# Patient Record
Sex: FEMALE | Race: WHITE | NOT HISPANIC OR LATINO | Employment: FULL TIME | URBAN - METROPOLITAN AREA
[De-identification: names, ages, dates, MRNs, and addresses within clinical notes are randomized per-mention and may not be internally consistent; named-entity substitution may affect disease eponyms.]

---

## 2023-04-16 ENCOUNTER — APPOINTMENT (OUTPATIENT)
Dept: ULTRASOUND IMAGING | Facility: CLINIC | Age: 43
End: 2023-04-16
Attending: EMERGENCY MEDICINE
Payer: COMMERCIAL

## 2023-04-16 ENCOUNTER — HOSPITAL ENCOUNTER (EMERGENCY)
Facility: CLINIC | Age: 43
Discharge: HOME | End: 2023-04-16
Attending: EMERGENCY MEDICINE | Admitting: EMERGENCY MEDICINE
Payer: COMMERCIAL

## 2023-04-16 ENCOUNTER — APPOINTMENT (OUTPATIENT)
Dept: RADIOLOGY | Facility: CLINIC | Age: 43
End: 2023-04-16
Attending: EMERGENCY MEDICINE
Payer: COMMERCIAL

## 2023-04-16 ENCOUNTER — APPOINTMENT (OUTPATIENT)
Dept: CT IMAGING | Facility: CLINIC | Age: 43
End: 2023-04-16
Attending: EMERGENCY MEDICINE
Payer: COMMERCIAL

## 2023-04-16 VITALS
OXYGEN SATURATION: 99 % | RESPIRATION RATE: 24 BRPM | HEART RATE: 78 BPM | BODY MASS INDEX: 30.9 KG/M2 | SYSTOLIC BLOOD PRESSURE: 137 MMHG | WEIGHT: 180 LBS | DIASTOLIC BLOOD PRESSURE: 77 MMHG | TEMPERATURE: 98.1 F

## 2023-04-16 DIAGNOSIS — D50.9 MICROCYTIC ANEMIA: ICD-10-CM

## 2023-04-16 DIAGNOSIS — M25.572 ACUTE LEFT ANKLE PAIN: ICD-10-CM

## 2023-04-16 DIAGNOSIS — I82.4Z2 ACUTE DEEP VEIN THROMBOSIS (DVT) OF DISTAL VEIN OF LEFT LOWER EXTREMITY (H): ICD-10-CM

## 2023-04-16 DIAGNOSIS — F41.9 ANXIETY: ICD-10-CM

## 2023-04-16 DIAGNOSIS — R11.0 NAUSEA: ICD-10-CM

## 2023-04-16 DIAGNOSIS — R53.83 OTHER FATIGUE: ICD-10-CM

## 2023-04-16 LAB
ALBUMIN SERPL-MCNC: 3.7 G/DL (ref 3.5–5)
ALP SERPL-CCNC: 66 U/L (ref 45–120)
ALT SERPL W P-5'-P-CCNC: 18 U/L (ref 0–45)
ANION GAP SERPL CALCULATED.3IONS-SCNC: 8 MMOL/L (ref 5–18)
AST SERPL W P-5'-P-CCNC: 16 U/L (ref 0–40)
ATRIAL RATE - MUSE: 79 BPM
ATRIAL RATE - MUSE: 81 BPM
BASOPHILS # BLD AUTO: 0 10E3/UL (ref 0–0.2)
BASOPHILS NFR BLD AUTO: 0 %
BILIRUB SERPL-MCNC: 0.2 MG/DL (ref 0–1)
BUN SERPL-MCNC: 17 MG/DL (ref 8–22)
CALCIUM SERPL-MCNC: 9.1 MG/DL (ref 8.5–10.5)
CHLORIDE BLD-SCNC: 110 MMOL/L (ref 98–107)
CO2 SERPL-SCNC: 21 MMOL/L (ref 22–31)
CREAT SERPL-MCNC: 0.73 MG/DL (ref 0.6–1.1)
D DIMER PPP FEU-MCNC: 0.82 UG/ML FEU (ref 0–0.5)
DIASTOLIC BLOOD PRESSURE - MUSE: 90 MMHG
DIASTOLIC BLOOD PRESSURE - MUSE: 94 MMHG
EOSINOPHIL # BLD AUTO: 0.1 10E3/UL (ref 0–0.7)
EOSINOPHIL NFR BLD AUTO: 1 %
ERYTHROCYTE [DISTWIDTH] IN BLOOD BY AUTOMATED COUNT: 15.9 % (ref 10–15)
GFR SERPL CREATININE-BSD FRML MDRD: >90 ML/MIN/1.73M2
GLUCOSE BLD-MCNC: 131 MG/DL (ref 70–125)
HCG SERPL QL: NEGATIVE
HCT VFR BLD AUTO: 31.3 % (ref 35–47)
HGB BLD-MCNC: 9.4 G/DL (ref 11.7–15.7)
IMM GRANULOCYTES # BLD: 0 10E3/UL
IMM GRANULOCYTES NFR BLD: 0 %
INTERPRETATION ECG - MUSE: NORMAL
INTERPRETATION ECG - MUSE: NORMAL
LIPASE SERPL-CCNC: 50 U/L (ref 0–52)
LYMPHOCYTES # BLD AUTO: 1.7 10E3/UL (ref 0.8–5.3)
LYMPHOCYTES NFR BLD AUTO: 22 %
MAGNESIUM SERPL-MCNC: 1.9 MG/DL (ref 1.8–2.6)
MCH RBC QN AUTO: 22.7 PG (ref 26.5–33)
MCHC RBC AUTO-ENTMCNC: 30 G/DL (ref 31.5–36.5)
MCV RBC AUTO: 75 FL (ref 78–100)
MONOCYTES # BLD AUTO: 0.6 10E3/UL (ref 0–1.3)
MONOCYTES NFR BLD AUTO: 8 %
NEUTROPHILS # BLD AUTO: 5.5 10E3/UL (ref 1.6–8.3)
NEUTROPHILS NFR BLD AUTO: 69 %
NRBC # BLD AUTO: 0 10E3/UL
NRBC BLD AUTO-RTO: 0 /100
P AXIS - MUSE: 26 DEGREES
P AXIS - MUSE: 36 DEGREES
PLATELET # BLD AUTO: 244 10E3/UL (ref 150–450)
POTASSIUM BLD-SCNC: 4.1 MMOL/L (ref 3.5–5)
PR INTERVAL - MUSE: 166 MS
PR INTERVAL - MUSE: 170 MS
PROT SERPL-MCNC: 7.3 G/DL (ref 6–8)
QRS DURATION - MUSE: 72 MS
QRS DURATION - MUSE: 72 MS
QT - MUSE: 354 MS
QT - MUSE: 372 MS
QTC - MUSE: 411 MS
QTC - MUSE: 426 MS
R AXIS - MUSE: -11 DEGREES
R AXIS - MUSE: -6 DEGREES
RBC # BLD AUTO: 4.15 10E6/UL (ref 3.8–5.2)
SODIUM SERPL-SCNC: 139 MMOL/L (ref 136–145)
SYSTOLIC BLOOD PRESSURE - MUSE: 150 MMHG
SYSTOLIC BLOOD PRESSURE - MUSE: 160 MMHG
T AXIS - MUSE: 35 DEGREES
T AXIS - MUSE: 42 DEGREES
TROPONIN I SERPL-MCNC: 0.01 NG/ML (ref 0–0.29)
TROPONIN I SERPL-MCNC: 0.02 NG/ML (ref 0–0.29)
VENTRICULAR RATE- MUSE: 79 BPM
VENTRICULAR RATE- MUSE: 81 BPM
WBC # BLD AUTO: 7.9 10E3/UL (ref 4–11)

## 2023-04-16 PROCEDURE — 84484 ASSAY OF TROPONIN QUANT: CPT | Mod: 91 | Performed by: EMERGENCY MEDICINE

## 2023-04-16 PROCEDURE — 36415 COLL VENOUS BLD VENIPUNCTURE: CPT | Performed by: EMERGENCY MEDICINE

## 2023-04-16 PROCEDURE — 73610 X-RAY EXAM OF ANKLE: CPT | Mod: LT

## 2023-04-16 PROCEDURE — 99285 EMERGENCY DEPT VISIT HI MDM: CPT | Mod: 25

## 2023-04-16 PROCEDURE — 250N000013 HC RX MED GY IP 250 OP 250 PS 637: Performed by: EMERGENCY MEDICINE

## 2023-04-16 PROCEDURE — 83735 ASSAY OF MAGNESIUM: CPT | Performed by: EMERGENCY MEDICINE

## 2023-04-16 PROCEDURE — 85014 HEMATOCRIT: CPT | Performed by: EMERGENCY MEDICINE

## 2023-04-16 PROCEDURE — 84484 ASSAY OF TROPONIN QUANT: CPT | Performed by: EMERGENCY MEDICINE

## 2023-04-16 PROCEDURE — 83690 ASSAY OF LIPASE: CPT | Performed by: EMERGENCY MEDICINE

## 2023-04-16 PROCEDURE — 93005 ELECTROCARDIOGRAM TRACING: CPT | Mod: 76 | Performed by: EMERGENCY MEDICINE

## 2023-04-16 PROCEDURE — 71275 CT ANGIOGRAPHY CHEST: CPT

## 2023-04-16 PROCEDURE — 93971 EXTREMITY STUDY: CPT | Mod: LT

## 2023-04-16 PROCEDURE — 250N000011 HC RX IP 250 OP 636: Performed by: EMERGENCY MEDICINE

## 2023-04-16 PROCEDURE — 85379 FIBRIN DEGRADATION QUANT: CPT | Performed by: EMERGENCY MEDICINE

## 2023-04-16 PROCEDURE — 84703 CHORIONIC GONADOTROPIN ASSAY: CPT | Performed by: EMERGENCY MEDICINE

## 2023-04-16 PROCEDURE — 80053 COMPREHEN METABOLIC PANEL: CPT | Performed by: EMERGENCY MEDICINE

## 2023-04-16 RX ORDER — HYDROXYZINE HYDROCHLORIDE 25 MG/1
25 TABLET, FILM COATED ORAL ONCE
Status: COMPLETED | OUTPATIENT
Start: 2023-04-16 | End: 2023-04-16

## 2023-04-16 RX ORDER — IOPAMIDOL 755 MG/ML
90 INJECTION, SOLUTION INTRAVASCULAR ONCE
Status: COMPLETED | OUTPATIENT
Start: 2023-04-16 | End: 2023-04-16

## 2023-04-16 RX ORDER — HYDROXYZINE HYDROCHLORIDE 25 MG/1
25 TABLET, FILM COATED ORAL 3 TIMES DAILY PRN
Qty: 30 TABLET | Refills: 0 | Status: SHIPPED | OUTPATIENT
Start: 2023-04-16

## 2023-04-16 RX ORDER — HYDROXYZINE HYDROCHLORIDE 25 MG/1
25 TABLET, FILM COATED ORAL 3 TIMES DAILY PRN
Qty: 30 TABLET | Refills: 0 | Status: SHIPPED | OUTPATIENT
Start: 2023-04-16 | End: 2023-04-16

## 2023-04-16 RX ADMIN — HYDROXYZINE HYDROCHLORIDE 25 MG: 25 TABLET ORAL at 04:20

## 2023-04-16 RX ADMIN — IOPAMIDOL 90 ML: 755 INJECTION, SOLUTION INTRAVENOUS at 06:48

## 2023-04-16 RX ADMIN — RIVAROXABAN 15 MG: 15 TABLET, FILM COATED ORAL at 07:39

## 2023-04-16 ASSESSMENT — ACTIVITIES OF DAILY LIVING (ADL)
ADLS_ACUITY_SCORE: 35
ADLS_ACUITY_SCORE: 35

## 2023-04-16 NOTE — ED PROVIDER NOTES
EMERGENCY DEPARTMENT ENCOUNTER      NAME: Anjali Pascal  AGE: 42 year old female  YOB: 1980  MRN: [id number]  EVALUATION DATE & TIME: 2023  3:40 AM    PCP: Kailey Bryant    ED PROVIDER: Hernesto Godoy MD        Chief Complaint   Patient presents with     Nausea     Ankle Pain         FINAL IMPRESSION:  1. Anxiety    2. Acute left ankle pain    3. Other fatigue    4. Microcytic anemia          ED COURSE & MEDICAL DECISION MAKING:    Pertinent Labs & Imaging studies reviewed. (See chart for details)  42 year old female presents to the Emergency Department for evaluation of nausea, anxiety, dizziness, tingling fingers, restlessness started roughly 1.5 hours prior to arrival while at work.  Been under a lot of stress recently reports her grandmother  recently working 80 hours a week and only getting paid for 50 hours.  Denies any vomiting, chest pain, or abdominal pain    No dysuria or urinary frequency    Not on any medications.  States she does not drink alcohol regularly    On exam slightly anxious appearing.  Seizure, serotonin syndrome unlikely.  Alcohol withdrawal unlikely.    We will obtain EKG and labs to evaluate for ACS, hyponatremia, decreased magnesium, hypoglycemia, hypocalcemia, pregnancy    Such as troponin, magnesium, BMP, CBC, D-dimer, ECG    Suspect symptoms are secondary to anxiety was given hydroxyzine since no acute     has some left ankle pain after walking around and flying back from [place] at her grandmother's wake.  Tenderness over lateral malleoli.  Septic joint unlikely.  Likely ankle sprain will obtain x-ray to look for fracture.  Will obtain D-dimer and if elevated ultrasound lower extremities since pain does extend into her left calf    Elevated D-dimer and therefore ultrasound ordered    Given hydroxyzine for likely anxiety and symptoms have improved    ED Course as of 23 0516   Sun 2023   0500 Magnesium: 1.9   0501 HCG Qualitative Serum:  Negative   0501 Troponin I: 0.02   0501 Lipase: 50   0501 Bilirubin Total: 0.2   0501 ALT: 18   0501 AST: 16   0501 Alkaline Phosphatase: 66   0501 Calcium: 9.1   0501 Glucose(!): 131  Nonfasting   0501 Sodium: 139   0501 Potassium: 4.1   0503 Troponin I: 0.02  Obtain delta troponin and EKG   0505 Hemoglobin(!): 9.4   0505 MCV(!): 75  Chronic microcytic anemia.  Patient reports chronic fatigue.  Discussed follow-up primary care doctor for iron studies   0505 WBC: 7.9   0514 Ankle x-ray negative     Plan for discharge home with hydroxyzine 3 times daily.  Recommend follow-up with primary care doctor for consideration of SSRIs for anxiety.    Patient signed out to Dr. Raymundo pending completion of ultrasound and 0700 EKG and trop    If negative plan for discharge home with hydroxyzine 3 times daily      Medical Decision Making    History:    Supplemental history from: Documented in chart, if applicable    External Record(s) reviewed: Documented in chart, if applicable.    Work Up:    Chart documentation includes differential considered and any EKGs or imaging independently interpreted by provider, where specified.    In additional to work up documented, I considered the following work up: Documented in chart, if applicable.    External consultation:    Discussion of management with another provider: Documented in chart, if applicable    Complicating factors:    Care impacted by chronic illness: Mental Health    Care affected by social determinants of health: N/A    Disposition considerations: Admission considered. Patient was signed out to the oncoming physician, disposition pending.          Voice recognition software was used in the creation of this note. Any grammatical or nonsensical errors are due to inherent errors with the software and are not the intention of the writer.         At the conclusion of the encounter I discussed the results of all of the tests and the disposition. The questions were answered. The  "patient or family acknowledged understanding and was agreeable with the care plan.         MEDICATIONS GIVEN IN THE EMERGENCY:  Medications   hydrOXYzine (ATARAX) tablet 25 mg (25 mg Oral $Given 23 0420)       NEW PRESCRIPTIONS STARTED AT TODAY'S ER VISIT  New Prescriptions    HYDROXYZINE (ATARAX) 25 MG TABLET    Take 1 tablet (25 mg) by mouth 3 times daily as needed for itching          =================================================================    HPI    Triage note  \"Pt reports nausea, dizziness, finger tingling, and shakiness that began while at work approx 1.5 hours ago. Also reports left ankle pain/swelling since Tuesday. No known trauma/injury. Denies sick contacts. Denies emesis. Took tylenol around 2300 for HA that has resolved.       \"      Patient information was obtained from: patient        Anjali Pascal is a 42 year old female with a pertinent history of depression who presents to this ED  for evaluation of nausea, dizziness, finger tingling and overall malaise that started 1.5 hours prior to arrival.  Works a  at [employer] and is working 80 hours a week.  Today works from 9 AM until 1 something this morning.  Denies chest pain.  Not on any medications.  Had similar symptoms in the past when she used edibles but states she has not used edibles since.  Denies daily alcohol use.  No history alcohol withdrawal.  Overall states she has not been sleeping well has been under a lot of stress.  Grandmother recently  and recently traveled from [place] and has left ankle pain.  States she does feel anxious.    Denies any injury to her left ankle.  Has not seen primary care doctor in a long time.  Reports chronic fatigue        REVIEW OF SYSTEMS   Review of Systems     PAST MEDICAL HISTORY:  Past Medical History:   Diagnosis Date     Depressive disorder 2005     Thrombocytopenia, unspecified (H) 4/3/2007       PAST SURGICAL HISTORY:  No past surgical history on " file.        CURRENT MEDICATIONS:    hydrOXYzine (ATARAX) 25 MG tablet  albuterol (PROVENTIL HFA;VENTOLIN HFA) 90 mcg/actuation inhaler  sucralfate (CARAFATE) 1 gram tablet        ALLERGIES:  No Known Allergies    FAMILY HISTORY:  No family history on file.    SOCIAL HISTORY:   Social History     Socioeconomic History     Marital status: Single       VITALS:  /85   Pulse 67   Temp 98.1  F (36.7  C)   Resp 20   Wt 81.6 kg (180 lb)   SpO2 97%   BMI 30.90 kg/m      PHYSICAL EXAM      Vitals: /85   Pulse 67   Temp 98.1  F (36.7  C)   Resp 20   Wt 81.6 kg (180 lb)   SpO2 97%   BMI 30.90 kg/m    General: Appears in no acute distress, awake, alert, interactive.  Eyes: Conjunctivae non-injected. Sclera anicteric.  HENT: Atraumatic.  Neck: Supple.  Respiratory/Chest: Respiration unlabored.  No wheezing or crackles  Heart: Regular rate and rhythm  Abdomen: non distended.  No abdominal tenderness  Musculoskeletal: Tenderness over lateral ankle and left calf.  Full range of motion of left ankle.  No redness to left ankle.  Skin: Normal color. No rash or diaphoresis.  Neurologic: Face symmetric, moves all extremities spontaneously. Speech clear.  Psychiatric: Oriented to person, place, and time.  Depressed affect, no suicidal  ideation.  General restlessness.  No clonus       LAB:  All pertinent labs reviewed and interpreted.  Results for orders placed or performed during the hospital encounter of 04/16/23   XR Ankle Left G/E 3 Views    Impression    IMPRESSION:   1. No visualized acute fracture, malalignment or other acute osseous abnormality of the left ankle.  2. Mild degenerative changes in the tibiotalar joint.  3. Posterior and plantar calcaneal spurs.      Result Value Ref Range    Troponin I 0.02 0.00 - 0.29 ng/mL   Comprehensive metabolic panel   Result Value Ref Range    Sodium 139 136 - 145 mmol/L    Potassium 4.1 3.5 - 5.0 mmol/L    Chloride 110 (H) 98 - 107 mmol/L    Carbon Dioxide (CO2) 21  (L) 22 - 31 mmol/L    Anion Gap 8 5 - 18 mmol/L    Urea Nitrogen 17 8 - 22 mg/dL    Creatinine 0.73 0.60 - 1.10 mg/dL    Calcium 9.1 8.5 - 10.5 mg/dL    Glucose 131 (H) 70 - 125 mg/dL    Alkaline Phosphatase 66 45 - 120 U/L    AST 16 0 - 40 U/L    ALT 18 0 - 45 U/L    Protein Total 7.3 6.0 - 8.0 g/dL    Albumin 3.7 3.5 - 5.0 g/dL    Bilirubin Total 0.2 0.0 - 1.0 mg/dL    GFR Estimate >90 >60 mL/min/1.73m2   Result Value Ref Range    Lipase 50 0 - 52 U/L   Result Value Ref Range    Magnesium 1.9 1.8 - 2.6 mg/dL   HCG QUALitative pregnancy (blood)   Result Value Ref Range    hCG Serum Qualitative Negative Negative   D dimer quantitative   Result Value Ref Range    D-Dimer Quantitative 0.82 (H) 0.00 - 0.50 ug/mL FEU   CBC with platelets and differential   Result Value Ref Range    WBC Count 7.9 4.0 - 11.0 10e3/uL    RBC Count 4.15 3.80 - 5.20 10e6/uL    Hemoglobin 9.4 (L) 11.7 - 15.7 g/dL    Hematocrit 31.3 (L) 35.0 - 47.0 %    MCV 75 (L) 78 - 100 fL    MCH 22.7 (L) 26.5 - 33.0 pg    MCHC 30.0 (L) 31.5 - 36.5 g/dL    RDW 15.9 (H) 10.0 - 15.0 %    Platelet Count 244 150 - 450 10e3/uL    % Neutrophils 69 %    % Lymphocytes 22 %    % Monocytes 8 %    % Eosinophils 1 %    % Basophils 0 %    % Immature Granulocytes 0 %    NRBCs per 100 WBC 0 <1 /100    Absolute Neutrophils 5.5 1.6 - 8.3 10e3/uL    Absolute Lymphocytes 1.7 0.8 - 5.3 10e3/uL    Absolute Monocytes 0.6 0.0 - 1.3 10e3/uL    Absolute Eosinophils 0.1 0.0 - 0.7 10e3/uL    Absolute Basophils 0.0 0.0 - 0.2 10e3/uL    Absolute Immature Granulocytes 0.0 <=0.4 10e3/uL    Absolute NRBCs 0.0 10e3/uL   ECG 12-LEAD WITH MUSE (LHE)   Result Value Ref Range    Systolic Blood Pressure 160 mmHg    Diastolic Blood Pressure 94 mmHg    Ventricular Rate 81 BPM    Atrial Rate 81 BPM    MO Interval 170 ms    QRS Duration 72 ms     ms    QTc 411 ms    P Axis 36 degrees    R AXIS -6 degrees    T Axis 42 degrees    Interpretation ECG       Sinus rhythm  Anterior infarct (cited on  or before 29-NOV-2020)  Abnormal ECG  When compared with ECG of 09-DEC-2020 20:23,  Premature ventricular complexes are no longer Present  Confirmed by SEE ED PROVIDER NOTE FOR, ECG INTERPRETATION ([id number]),  Zeus Loja ([id number]) on 4/16/2023 4:48:27 AM         RADIOLOGY:  Reviewed all pertinent imaging. Please see official radiology report.  XR Ankle Left G/E 3 Views   Final Result   IMPRESSION:    1. No visualized acute fracture, malalignment or other acute osseous abnormality of the left ankle.   2. Mild degenerative changes in the tibiotalar joint.   3. Posterior and plantar calcaneal spurs.          US Lower Extremity Venous Duplex Left    (Results Pending)       EKG:    Performed at: April 16, 2023, 4:02 AM, [facility] EMERGENCY ROOM    Impression: Sinus rhythm. Anterior infarct. When compared with ECG of 9-DEC-2020, Premature ventricular complexes are no longer present.     Rate: 81 BPM  Rhythm: Sinus rhythm  Axis: 36 -6 42  MT Interval: 170 ms  QRS Interval: 72 ms  QTc Interval: 354/411 ms  ST Changes: No ST changes  Comparison: When compared with ECG of 9-DEC-2020, Premature ventricular complexes are no longer present.     I have independently reviewed and interpreted the EKG(s) documented above.          I, Dewayne Saha, am serving as a scribe to document services personally performed by Orlando Godoy MD based on my observation and the provider's statements to me. I, Dr. Orlando Godoy, attest that Dewayne Saha is acting in a scribe capacity, has observed my performance of the services and has documented them in accordance with my direction.    Orlando Godoy MD  Emergency Medicine  [facility] EMERGENCY ROOM  [address] [address]  [phone]       Orlando Godoy MD  04/16/23 0529

## 2023-04-16 NOTE — DISCHARGE INSTRUCTIONS
There are prescriptions for hydroxyzine  (anxiety medication) and Xarelto  (blood thinning medication) have been sent to your [facility] pharmacy in [place].      Dr. Godoy (initial ER provider) recommend hydroxyzine 3 times daily as needed.  Follow-up with your doctor for iron studies and for following of this DVT, blood clot in your leg.  Return to the ER for worsening symptoms, such as chest pain, difficulty breathing, feel like you could pass out, worsening pain of the leg or swelling, cold or numb toes, or any other concerns.    Thank you for choosing [facility].  It has been my pleasure caring for you today.     ~Dr. Trey MD

## 2023-04-16 NOTE — ED TRIAGE NOTES
Pt reports nausea, dizziness, finger tingling, and shakiness that began while at work approx 1.5 hours ago. Also reports left ankle pain/swelling since Tuesday. No known trauma/injury. Denies sick contacts. Denies emesis. Took tylenol around 2300 for HA that has resolved.
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
## 2023-11-13 ENCOUNTER — HOSPITAL ENCOUNTER (EMERGENCY)
Facility: CLINIC | Age: 43
Discharge: HOME | End: 2023-11-13
Admitting: PHYSICIAN ASSISTANT
Payer: COMMERCIAL

## 2023-11-13 ENCOUNTER — APPOINTMENT (OUTPATIENT)
Dept: CT IMAGING | Facility: CLINIC | Age: 43
End: 2023-11-13
Attending: PHYSICIAN ASSISTANT
Payer: COMMERCIAL

## 2023-11-13 ENCOUNTER — APPOINTMENT (OUTPATIENT)
Dept: ULTRASOUND IMAGING | Facility: CLINIC | Age: 43
End: 2023-11-13
Attending: STUDENT IN AN ORGANIZED HEALTH CARE EDUCATION/TRAINING PROGRAM
Payer: COMMERCIAL

## 2023-11-13 VITALS
RESPIRATION RATE: 18 BRPM | TEMPERATURE: 98.4 F | SYSTOLIC BLOOD PRESSURE: 128 MMHG | DIASTOLIC BLOOD PRESSURE: 85 MMHG | BODY MASS INDEX: 34.16 KG/M2 | OXYGEN SATURATION: 98 % | HEART RATE: 68 BPM | WEIGHT: 199 LBS

## 2023-11-13 DIAGNOSIS — R42 LIGHTHEADEDNESS: ICD-10-CM

## 2023-11-13 DIAGNOSIS — Z79.01 CHRONIC ANTICOAGULATION: ICD-10-CM

## 2023-11-13 DIAGNOSIS — I80.02 THROMBOPHLEBITIS OF SUPERFICIAL VEINS OF LEFT LOWER EXTREMITY: ICD-10-CM

## 2023-11-13 LAB
ALBUMIN UR-MCNC: 20 MG/DL
ANION GAP SERPL CALCULATED.3IONS-SCNC: 11 MMOL/L (ref 7–15)
APPEARANCE UR: CLEAR
ATRIAL RATE - MUSE: 82 BPM
BILIRUB UR QL STRIP: NEGATIVE
BUN SERPL-MCNC: 11.6 MG/DL (ref 6–20)
CALCIUM SERPL-MCNC: 10.3 MG/DL (ref 8.6–10)
CHLORIDE SERPL-SCNC: 104 MMOL/L (ref 98–107)
COLOR UR AUTO: YELLOW
CREAT SERPL-MCNC: 0.66 MG/DL (ref 0.51–0.95)
DEPRECATED HCO3 PLAS-SCNC: 23 MMOL/L (ref 22–29)
DIASTOLIC BLOOD PRESSURE - MUSE: NORMAL MMHG
EGFRCR SERPLBLD CKD-EPI 2021: >90 ML/MIN/1.73M2
ERYTHROCYTE [DISTWIDTH] IN BLOOD BY AUTOMATED COUNT: 13 % (ref 10–15)
GLUCOSE SERPL-MCNC: 105 MG/DL (ref 70–99)
GLUCOSE UR STRIP-MCNC: NEGATIVE MG/DL
HCG SERPL QL: NEGATIVE
HCT VFR BLD AUTO: 42.7 % (ref 35–47)
HGB BLD-MCNC: 13.6 G/DL (ref 11.7–15.7)
HGB UR QL STRIP: ABNORMAL
HOLD SPECIMEN: NORMAL
INTERPRETATION ECG - MUSE: NORMAL
KETONES UR STRIP-MCNC: NEGATIVE MG/DL
LEUKOCYTE ESTERASE UR QL STRIP: NEGATIVE
MAGNESIUM SERPL-MCNC: 2.2 MG/DL (ref 1.7–2.3)
MCH RBC QN AUTO: 28.7 PG (ref 26.5–33)
MCHC RBC AUTO-ENTMCNC: 31.9 G/DL (ref 31.5–36.5)
MCV RBC AUTO: 90 FL (ref 78–100)
MUCOUS THREADS #/AREA URNS LPF: PRESENT /LPF
NITRATE UR QL: NEGATIVE
P AXIS - MUSE: 50 DEGREES
PH UR STRIP: 5.5 [PH] (ref 5–7)
PLATELET # BLD AUTO: 187 10E3/UL (ref 150–450)
POTASSIUM SERPL-SCNC: 4.1 MMOL/L (ref 3.4–5.3)
PR INTERVAL - MUSE: 164 MS
QRS DURATION - MUSE: 76 MS
QT - MUSE: 362 MS
QTC - MUSE: 422 MS
R AXIS - MUSE: 23 DEGREES
RBC # BLD AUTO: 4.74 10E6/UL (ref 3.8–5.2)
RBC URINE: 2 /HPF
SODIUM SERPL-SCNC: 138 MMOL/L (ref 135–145)
SP GR UR STRIP: >1.03 (ref 1–1.03)
SQUAMOUS EPITHELIAL: 1 /HPF
SYSTOLIC BLOOD PRESSURE - MUSE: NORMAL MMHG
T AXIS - MUSE: 39 DEGREES
TROPONIN T SERPL HS-MCNC: <6 NG/L
TSH SERPL DL<=0.005 MIU/L-ACNC: 0.46 UIU/ML (ref 0.3–4.2)
UROBILINOGEN UR STRIP-MCNC: 2 MG/DL
VENTRICULAR RATE- MUSE: 82 BPM
WBC # BLD AUTO: 10.8 10E3/UL (ref 4–11)
WBC URINE: 0 /HPF

## 2023-11-13 PROCEDURE — 84484 ASSAY OF TROPONIN QUANT: CPT | Performed by: PHYSICIAN ASSISTANT

## 2023-11-13 PROCEDURE — 250N000011 HC RX IP 250 OP 636: Performed by: RADIOLOGY

## 2023-11-13 PROCEDURE — 93971 EXTREMITY STUDY: CPT | Mod: LT

## 2023-11-13 PROCEDURE — 99285 EMERGENCY DEPT VISIT HI MDM: CPT | Mod: 25

## 2023-11-13 PROCEDURE — 84703 CHORIONIC GONADOTROPIN ASSAY: CPT | Performed by: PHYSICIAN ASSISTANT

## 2023-11-13 PROCEDURE — 96360 HYDRATION IV INFUSION INIT: CPT | Mod: 59

## 2023-11-13 PROCEDURE — 80048 BASIC METABOLIC PNL TOTAL CA: CPT | Performed by: PHYSICIAN ASSISTANT

## 2023-11-13 PROCEDURE — 84443 ASSAY THYROID STIM HORMONE: CPT | Performed by: PHYSICIAN ASSISTANT

## 2023-11-13 PROCEDURE — 83735 ASSAY OF MAGNESIUM: CPT | Performed by: PHYSICIAN ASSISTANT

## 2023-11-13 PROCEDURE — 258N000003 HC RX IP 258 OP 636: Performed by: PHYSICIAN ASSISTANT

## 2023-11-13 PROCEDURE — 81001 URINALYSIS AUTO W/SCOPE: CPT | Performed by: PHYSICIAN ASSISTANT

## 2023-11-13 PROCEDURE — 93005 ELECTROCARDIOGRAM TRACING: CPT | Performed by: PHYSICIAN ASSISTANT

## 2023-11-13 PROCEDURE — 96361 HYDRATE IV INFUSION ADD-ON: CPT

## 2023-11-13 PROCEDURE — 85027 COMPLETE CBC AUTOMATED: CPT | Performed by: PHYSICIAN ASSISTANT

## 2023-11-13 PROCEDURE — 36415 COLL VENOUS BLD VENIPUNCTURE: CPT | Performed by: PHYSICIAN ASSISTANT

## 2023-11-13 PROCEDURE — 71275 CT ANGIOGRAPHY CHEST: CPT

## 2023-11-13 RX ORDER — IOPAMIDOL 755 MG/ML
100 INJECTION, SOLUTION INTRAVASCULAR ONCE
Status: COMPLETED | OUTPATIENT
Start: 2023-11-13 | End: 2023-11-13

## 2023-11-13 RX ADMIN — SODIUM CHLORIDE 1000 ML: 9 INJECTION, SOLUTION INTRAVENOUS at 20:14

## 2023-11-13 RX ADMIN — IOPAMIDOL 100 ML: 755 INJECTION, SOLUTION INTRAVENOUS at 20:56

## 2023-11-13 ASSESSMENT — ACTIVITIES OF DAILY LIVING (ADL): ADLS_ACUITY_SCORE: 35

## 2023-11-13 NOTE — ED TRIAGE NOTES
Pt presents to the ED with c/o left leg swelling that was first noticed today. Pt endorses pain in calf. Pt is on Rivaroxaban. Endorses hx of clot in past. Pt reports being lightheaded as well. No recent fevers. Denies N/V.      Triage Assessment (Adult)       Row Name 11/13/23 1704          Triage Assessment    Airway WDL WDL        Respiratory WDL    Respiratory WDL WDL        Skin Circulation/Temperature WDL    Skin Circulation/Temperature WDL WDL        Cardiac WDL    Cardiac WDL WDL        Peripheral/Neurovascular WDL    Peripheral Neurovascular WDL WDL        Cognitive/Neuro/Behavioral WDL    Cognitive/Neuro/Behavioral WDL WDL

## 2023-11-14 NOTE — ED PROVIDER NOTES
Emergency Department Encounter   NAME: Anjali Pascal ; AGE: 43 year old female ; YOB: 1980 ; MRN: [id number] ; PCP: Kailey Bryant   ED PROVIDER: Catrachita Luevano PA-C    Evaluation Date & Time:   No admission date for patient encounter.    CHIEF COMPLAINT:  Leg Swelling and Dizziness      Impression and Plan   MDM:   Anjali Pascal is a 43 year old female with a pertinent history of Thrombocytopenia, Depression who presents to the ED by wheelchair for evaluation of leg swelling.     Patient presents to the emergency department for evaluation of left-sided lateral calf discomfort and mild swelling which she noticed while at work today.  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  While sitting in the emergency department, she began to feel lightheaded and nauseated which has slowly resolved, no vertiginous dizziness or syncope.  Upon my evaluation the patient, she is well-appearing, very pleasant, and in no acute distress.  Mild hypertension of 141/97 and borderline tachycardic with a heart rate of 101, oxygen saturations 100%.  Exam only notable for a left lateral calf tenderness.  Considered a broad differential and we discussed plan for ultrasound left lower extremity to rule out DVT/superficial thrombophlebitis.  There is no evidence of cellulitis, abscess, or deep soft tissue space infection.  We will also assess electrolytes and renal function given mild swelling.  No notable swelling, pitting, shortness of breath, or crackles to suggest CHF.  Given her lightheadedness, will obtain EKG and further laboratory work-up including pregnancy, TSH, UA to further evaluate.  She does report some left thoracic back pain which has been intermittent over the past week, given her known history of DVT and chronic anticoagulation, she is moderate to high risk for PE.  Given this we will obtain CT PE study to further evaluate.    EKG with NSR and low voltage QRS. T  wave inversion in V1 though no evidence of acute ischemic changes. EKG shows no evidence of pathologic arrhythmia including delta wave, prolonged QTC, Brugada criteria, LVH, AV block, short MN, HOCM, ARVC, or ASD.  Initial troponin less than 6.  Very low suspicion for ACS without any chest pain or shortness of breath.  Pregnancy test negative.  TSH within normal limits.  No concerning metabolic derangements.  Mild elevation in calcium and glucose.  UA negative for signs of infection.  CT PE study negative for PE.  Lower extremity ultrasound is negative for DVT, however she does have superficial venous thrombosis in the distal left calf which is the likely source of her pain.  Discussed findings with patient, she does intermittently forget to take her Xarelto has been doing well with this recently.  She was encouraged to continue taking the Xarelto as prescribed and to not miss doses.  We discussed supportive measures for her superficial thrombophlebitis at home including elevation, compression, and icing.  She is unfortunately not a candidate for NSAIDs due to her chronic anticoagulation.  She has never seen hematology for this, and given her breakthrough thrombophlebitis on anticoagulation, will refer her for further work-up.  She does have a good relationship with her primary care provider and she was advised to follow-up in 7 to 10 days to repeat ultrasound of the left leg to confirm resolution and no extension into the deep venous system.  Patient is comfortable with this plan and feels she can manage the pain with Tylenol and supportive measures at home.  No concerning etiology of her lightheadedness episode in the ER.  We discussed discharge, follow-up, and reasons to return to the emergency department and she verbalized understanding.    *Patient examination and workup was initiated in triage due to inpatient hospital and Emergency Department bed shortage resulting in long waiting room wait times. Patient  and/or guardian's consent was obtained.       Medical Decision Making    History:  Supplemental history from: Documented in chart, if applicable  External Record(s) reviewed: Inpatient Record: ED visit from 4/16/2023   /  Work Up:  Chart documentation includes differential considered and any EKGs or imaging independently interpreted by provider, where specified.  In additional to work up documented, I considered the following work up: Documented in chart, if applicable.    External consultation:  Discussion of management with another provider: Documented in chart, if applicable    Complicating factors:  Care impacted by chronic illness: Anticoagulated State and Mental Health  Care affected by social determinants of health: N/A    Disposition considerations: Discharge. I recommended the patient continue their current prescription strength medication(s): xarelto. See documentation for any additional details.      ED COURSE:  6:05 PM  I met and introduced myself to the patient. I gathered initial history and performed my physical exam. We discussed plan for initial workup.   6:08 PM The patient gave a urine sample.   9:30 PM Discussed case with Dr. Jaimes, ED MD.   9:45 PM I rechecked the patient and discussed results, discharge, follow up, and reasons to return to the ED.     At the conclusion of the encounter I discussed the results of all the tests and the disposition. The questions were answered. The patient or family acknowledged understanding and was agreeable with the care plan.    FINAL IMPRESSION:    ICD-10-CM    1. Thrombophlebitis of superficial veins of left lower extremity  I80.02       2. Chronic anticoagulation  Z79.01       3. Lightheadedness  R42             MEDICATIONS GIVEN IN THE EMERGENCY DEPARTMENT:  Medications   sodium chloride 0.9% BOLUS 1,000 mL (0 mLs Intravenous Stopped 11/13/23 2157)   iopamidol (ISOVUE-370) solution 100 mL (100 mLs Intravenous $Given 11/13/23 2056)         NEW  PRESCRIPTIONS STARTED AT TODAY'S ED VISIT:  Discharge Medication List as of 11/13/2023 10:07 PM            HPI   Patient information was obtained from: Patient   Use of Intrepreter: N/A     Anjali Pascal is a 43 year old female with a pertinent history of Thrombocytopenia, Depression who presents to the ED by wheelchair for evaluation of leg swelling.     The patient reports that while at work today, she noticed that her left leg did not look right. She said that her left leg was swollen. Her leg is not in pain, but she described it as more of a throbbing and uncomfortable feeling. The patient has experienced a blood clot in her left leg in the past (April 2023). She currently takes the blood thinner Xarelto for it. She is unaware if she missed a dose recently, but reports that she is consistent with taking her medication. She last took Xarelto today. The patient reports of having some chills, but no fevers or numbness. Denies any new chest pain.     Of note, while waiting in the ED today, the patient mentioned that she started to feel  lightheaded. While she was sitting she felt nauseas, and like she was going to pass out, but she did not. This episode lasted about one hour, and she now feels fine. She also mentioned that she has had some back pain over the last week, that shoots up the center of her back.       REVIEW OF SYSTEMS:  Pertinent positive and negative symptoms per HPI.       Medical History     Past Medical History:   Diagnosis Date    Depressive disorder 1/1/2005    Thrombocytopenia, unspecified (H24) 4/3/2007       History reviewed. No pertinent surgical history.    History reviewed. No pertinent family history.         albuterol (PROVENTIL HFA;VENTOLIN HFA) 90 mcg/actuation inhaler  hydrOXYzine (ATARAX) 25 MG tablet  Rivaroxaban ANTICOAGULANT 15 & 20 MG TBPK Starter Therapy Pack  sucralfate (CARAFATE) 1 gram tablet          Physical Exam     First Vitals:  Patient Vitals for the past 24 hrs:   BP  Temp Temp src Pulse Resp SpO2 Weight   11/13/23 2214 128/85 -- -- 68 18 98 % --   11/13/23 1706 (!) 141/97 98.4  F (36.9  C) Temporal 101 18 100 % 90.3 kg (199 lb)   11/13/23 1701 (!) 141/86 -- -- -- 20 -- --         PHYSICAL EXAM:   Physical Exam  Vitals and nursing note reviewed.   Constitutional:       General: She is not in acute distress.     Appearance: Normal appearance. She is not ill-appearing, toxic-appearing or diaphoretic.   HENT:      Head: Normocephalic.   Eyes:      Conjunctiva/sclera: Conjunctivae normal.   Cardiovascular:      Rate and Rhythm: Normal rate and regular rhythm.      Pulses: Normal pulses.      Heart sounds: Normal heart sounds. No murmur heard.  Pulmonary:      Effort: Pulmonary effort is normal.      Breath sounds: Normal breath sounds.   Abdominal:      General: Abdomen is flat.      Palpations: Abdomen is soft.      Tenderness: There is no abdominal tenderness.   Musculoskeletal:      Cervical back: Normal range of motion and neck supple.      Comments: No noteable edema or pitting.  No palpable cords.  Tenderness to lateral left calf no tenderness along deep venous system.  2+ DP and PT pulses and extremity is warm and well-perfused with distal cap refill less than 2 seconds.  5 out of 5 strength with bilateral hip flexion, knee flexion extension, dorsiflexion and plantarflexion against resistance.  Sensation to light touch intact.  There is no erythema, warmth, crepitus or skin changes.   Neurological:      Mental Status: She is alert. Mental status is at baseline.      Comments: No focal neurologic deficit.             Results     LAB:  All pertinent labs reviewed and interpreted  Labs Ordered and Resulted from Time of ED Arrival to Time of ED Departure   BASIC METABOLIC PANEL - Abnormal       Result Value    Sodium 138      Potassium 4.1      Chloride 104      Carbon Dioxide (CO2) 23      Anion Gap 11      Urea Nitrogen 11.6      Creatinine 0.66      GFR Estimate >90       Calcium 10.3 (*)     Glucose 105 (*)    ROUTINE UA WITH MICROSCOPIC REFLEX TO CULTURE - Abnormal    Color Urine Yellow      Appearance Urine Clear      Glucose Urine Negative      Bilirubin Urine Negative      Ketones Urine Negative      Specific Gravity Urine >1.030      Blood Urine 0.03 mg/dL (*)     pH Urine 5.5      Protein Albumin Urine 20 (*)     Urobilinogen Urine 2.0 (*)     Nitrite Urine Negative      Leukocyte Esterase Urine Negative      Mucus Urine Present (*)     RBC Urine 2      WBC Urine 0      Squamous Epithelials Urine 1     CBC WITH PLATELETS - Normal    WBC Count 10.8      RBC Count 4.74      Hemoglobin 13.6      Hematocrit 42.7      MCV 90      MCH 28.7      MCHC 31.9      RDW 13.0      Platelet Count 187     MAGNESIUM - Normal    Magnesium 2.2     TSH WITH FREE T4 REFLEX - Normal    TSH 0.46     TROPONIN T, HIGH SENSITIVITY - Normal    Troponin T, High Sensitivity <6     HCG QUALITATIVE PREGNANCY - Normal    hCG Serum Qualitative Negative         RADIOLOGY:  CT Chest Pulmonary Embolism w Contrast   Final Result   IMPRESSION:   1.  No evidence for pulmonary embolism or other abnormality in the chest.      US Lower Extremity Venous Duplex Left   Final Result   IMPRESSION:   1.  No deep venous thrombosis in the left lower extremity.      2.  Superficial venous thrombosis in the distal left calf.            ECG:  Performed at: 7:14:53 PM    Impression: Sinus rhythm, Low voltage QRS, Cannot rule out Anterior infarct    Rate: 82  Rhythm: Sinus  Axis: 23  MD Interval: 164  QRS Interval: 76  QTc Interval: 422  ST Changes: No changes when compared with previous ECG  Comparison: ECG 16-APR-23    EKG results reviewed and interpreted by attending physician.      I, Marly Castañeda, am serving as a scribe to document services personally performed by Catrachita Luevano PA-C, based on my observation and the provider's statements to me. I, Catrachita Luevano PA-C attest that Marly Castañeda is acting in a scribe  capacity, has observed my performance of the services and has documented them in accordance with my direction.       Catrachita Luevano PA-C   Emergency Medicine   [facility] EMERGENCY ROOM      Catrachita Luevano PA-C  11/13/23 4653

## 2023-11-14 NOTE — DISCHARGE INSTRUCTIONS
As we discussed, you have tiny blood clots in the superficial veins in your legs.  It is very important that you are taking your Xarelto as prescribed and not missing any doses.  I would like you to follow-up in your primary care clinic in 7 to 10 days to repeat an ultrasound of the legs and make sure you have not developed a blood clot in your deep venous system.  If it anytime you have return of lightheadedness, near loss of consciousness, chest pain, shortness of breath, worsening pain, swelling or redness of the leg, please return to the ER for further evaluation.  Please elevate leg when able, wear compression stockings to compress with an Ace wrap, and ice for pain relief.    Your blood pressure was elevated in the emergencydepartment today and requires recheck and close follow-up in your primary care clinic. Untreated blood pressure can cause serious complications including, but not limited to stroke, heart attack/failure, and kidney disease.  Please make a close follow-up appointment to have this recheck performed. Please return to the emergency department immediately if you develop a severe headache, vision changes, chest pain, shortness of breath, orabdominal pain.

## 2023-11-15 ENCOUNTER — TELEPHONE (OUTPATIENT)
Dept: HEMATOLOGY | Facility: CLINIC | Age: 43
End: 2023-11-15
Payer: COMMERCIAL

## 2023-11-15 NOTE — TELEPHONE ENCOUNTER
"[id number]  Anjali Pascal  43 year old female    Incoming call from Anjali. She was referred to [facility] and was recommended to have next available appointment. Appointment scheduled on 12/21/23 with Priscila Michaels PA-C.     Anjali calls RN team and wants to discuss timeline of appointment. She is anxious about diagnosis and wants to be seen sooner.    Anjali was diagnosed with a left leg DVT in April. Repeat imaging was not completed. She was prescribed 20 mg of Xarelto daily. Per ED notes, \"she does intermittently forget to take her Xarelto has been doing well with this recently.\"    Anjali was then diagnosed with a left distal superficial clot on 11/13. CT neg for PE. Recommendation was to follow-up with primary care provider in 7 to 10 days to repeat ultrasound of left leg to confirm resolution and no extension into deep vein system. Referred her to hematology for further work-up.    RN discussed the following with patient:  ~Offered her urgent slot on 12/5 with Priscila Michaels PA-C   ~Encouraged her to keep taking her Xarelto daily with food  ~Asked if she made a follow-up appointment with PCP as recommended  ~Encouraged compression and hot warm compresses on left leg for symptoms related to SVT  ~Provided education on superficial vs. Deep vein clots, superficial clots unlikely to break away into the lungs causing pulmonary embolism    Pt is reassured. Appt made for December 5th.      Elva Velasco RN, BSN, PCCN  Nurse Clinician    [facility]  [address] [place] [address], [address], [place] [address]   Office, direct: [phone]  Main office number: [phone]  Pronouns: She, her, hers        "

## 2023-12-04 NOTE — PROGRESS NOTES
[facility]  [address] [place] [id number]  Phone: [phone], Fax: [phone]    Outpatient Visit Note:    Patient: Anjali Pascal  MRN: [id number]  : 1980  XI: Dec 5, 2023    Reason for Consultation:  Anjali Pascal is a referred for evaluation and treatment of superficial thrombophlebitis    Assessment:  In summary, Anjali Pascal is a 43 year old female with past medical history significant for varicosities, superficial vein thrombosis, menorrhagia with secondary iron deficiency who presents today in consultation for superficial vein thrombosis. Anjali has prominent symptomatic bilateral lower extremity varicosities. She was diagnosed with her first episode of superficial vein thrombosis in 2023 after prolonged air travel. Her superficial vein thrombosis was in the LLSV and extended from calf to popliteal fossa >5cm thus she was started on Xarelto however she was not started at superficial vein thrombosis dosing but rather DVT dosing. No follow up US was performed however her symptoms improved. She was not taken off anticoagulation after provoked superficial vein thrombosis but did miss frequent doses. She then had recurrent symptoms of the left ankle and was diagnosed with smaller superficial vein thrombosis of LLSV. She was restarted on therapeutic Xarelto. This likely represents unresolved superficial vein thrombosis from prior episode and not a recurrent event. Fortunately, repeat US last week showed resolution of superficial vein thrombosis and no evidence of DVT. She has been tolerating Xarelto and with iron supplementation her LORAINE has resolved. Denies any worsening menorrhagia on Xarelto. Her major underlying risk factor for superficial vein thrombosis is her significant varicosities. She has been wearing compression but unclear was grade. Her family history is notable for maternal grandmother with reported DVT and maternal aunt with  superficial vein thrombosis due to varicosities. She has tolerated procedures, pregnancies without any venous thromboembolism.     Plan:  1. Majority of today's visit was spent counseling the patient regarding superficial thrombophlebitis pathophysiology, risk factors, risks/benefits of anticoagulation. I do believe that her most significant risk factor for future superficial thrombophlebitis is underlying venous stasis due to varicosities.   2. I have recommended she take 7 more days of Xarelto and then discontinue medication. She will keep the rest on hand to take prior to long distance travel.   3. Recommend daily knee high compression garments at 20-30mmHg.    4. The best way to prevent further episodes of venous thromboembolism is to intervene on her symptomatic varicosities. Will arrange for her to have venous competency study and refer to IR/vascular for intervention consideration as she has been wearing compression for > 6 months.   5. She should avoid tobacco and try to maintain healthy weight. She should try and walk >30 minutes most days of the week. Discussed avoidance of exogenous hormone therapy. She should stay up to date with age appropriate malignancy screenings.  6. Discussed inheritable thrombophilia testing. As it is unlikely to change her management plan, will defer testing.       The patient is given our center's contact information and is instructed to call if she should have any further questions or concerns.  Otherwise, we will plan on seeing her back as needed.      Patient understands and agrees with the above plan and recommendation.      Priscila Pearl, MPH, PA-C  [facility]    60 minutes spent by me on the date of the encounter doing chart review, review of outside records, review of test results, interpretation of tests, patient visit, and documentation      ----------------------------------------------------------------------------------------------------------------------  History of Present Illness:  Anjali Pascal is a 43 year old female with past medical history significant for hirsutism, hypertension, menorrhagia and iron deficiency who presents today in consultation for superficial vein thrombosis.     She presented to the ER on 4/16/2023 with left leg pain and swelling after traveling and was diagnosed with left distal superficial thrombophlebitis of lesser saphenous vein from ankle to popliteal fossa. No DVT was found. She had flown to [place] with layover in [place] with total flight time ~ 6 hours that day. Presumably due to length of superficial vein thrombosis >5 cm, she was started on anticoagulation however it was not started at typical dose for superficial vein thrombosis treatment which is Xarelto at 10mg once daily. Instead she was started on theraputic dose for treatment of DVT at Xarelto 20mg once daily. She did not have any follow up imaging but symptoms had completely resolved after a few months. She tolerated Xarelto well without any bleeding symptoms. She has longstanding history of menorrhagia with periods lasting up to 7-8 days, changing products due to saturation every 3-4 hours with nighttime awakenings and flooding symptoms. She notes her periods were not any longer or heavier on Xarelto. In follow up, she was found to have microcytic anemia. Her hemoglobin was 10.3 with MCV of 78.  She was started on oral iron and hemoglobin normalized to 12.4 in follow up.      It is unclear to me why she was kept on Xarelto longer than 45 days for provoked superficial vein thrombosis but she notes once her symptoms improved, she was frequently missing doses due to her work schedule.     On 11/13/2023, she was seen in the ED for new left ankle tenderness. She was evaluated in the ED and found to have superficial thrombophlebitis. Ultrasound was  negative for DVT. She had CT that was negative for PE. She was restarted on her home Xarelto again at 20mg once daily. She has been consistently taking it since then and her symptoms have resolved. She did have follow up ultrasound this week that showed resolution of superficial vein thrombosis and no evidence of DVT of the left lower extremity. She denies any provoking factors prior to this second episode. Given that she had not had an US after her first episode, this second episode is likely due to incomplete resolution of prior superficial vein thrombosis episode with extension given missed doses of anticoagulant.    Anjali notes that she has had varicosities since her children were born but they have been more symptomatic since earlier this year. She notes that her maternal aunt also had varicosities and had superficial vein thrombosis episodes. Her mother does not have varicosities and has no history of venous thromboembolism or superficial vein thrombosis. Her maternal grandmother reportedly had history of DVT in older age.  Anjali was started on compression garments back in April 2023 and has been wearing them most days of the week. 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     Anjali also has a history of gastropathy. She had a recent EGD that showed hiatal hernia, duodenitis. She held her Xarelto x 7 2hours prior to procedure. Testing was negative for celiac idsease and h pylori. She was diagnosed with non erosive reactive gastropathy. She is not on any other NSAIDs.     Her chart mentioned history of thrombocytopenia. I discussed this with her and she had gestational thrombocytopenia during her pregnancies. No thrombocytopenia noted when she was not pregnant.     She is not on any hormone containing birth control, HRT. She denies any chronic autoimmune conditions or inflammatory diseases. She is up to date with pap smear and mammogram. She is a non smoker. She denies any B Cell  symptoms.     She has not had any major abdominal or orthopedic surgeries or had extended hospitalizations. She denies history of prior venous thromboembolism. She has had three pregnancies and three vaginal deliveries. No miscarriages.     Past Medical History:  Past Medical History:   Diagnosis Date    Depressive disorder 01/01/2005    Gestational thrombocytopenia (H24)     Superficial vein thrombosis     Varicosities of leg        Past Surgical History:  No major abdominal or othropedic surgeries    Medications:  Current Outpatient Medications   Medication Sig Dispense Refill    albuterol (PROVENTIL HFA;VENTOLIN HFA) 90 mcg/actuation inhaler [ALBUTEROL (PROVENTIL HFA;VENTOLIN HFA) 90 MCG/ACTUATION INHALER] Inhale 2 puffs every 4 (four) hours as needed for wheezing. 1 Inhaler 0    ferrous sulfate (FE TABS) 325 (65 Fe) MG EC tablet Take 325 mg by mouth      hydrOXYzine (ATARAX) 25 MG tablet Take 1 tablet (25 mg) by mouth 3 times daily as needed for itching 30 tablet 0    spironolactone (ALDACTONE) 50 MG tablet Take 1/2 tablet (25mg) by mouth daily for 2 weeks then increase to 1 tablet (50mg) for hirsutism .      sucralfate (CARAFATE) 1 gram tablet [SUCRALFATE (CARAFATE) 1 GRAM TABLET] Take 1 tablet (1 g total) by mouth 4 (four) times a day. 40 tablet 0    Rivaroxaban ANTICOAGULANT 15 & 20 MG TBPK Starter Therapy Pack Take 15 mg by mouth 2 times daily (with meals) for 21 days, THEN 20 mg daily with food for 9 days. 51 each 0        Allergies:  No Known Allergies    ROS:  Remainder of a comprehensive 14 point ROS is negative unless noted above.    Social History:  Patient works as a [employer]  Denies any tobacco use. No significant alcohol use. Denies any illicit drug use.     Family History:  + MGM DVT   - Mother without history of DVT   + Maternal aunt with varicosities and superficial vein thrombosis   - for paternal family history of DVT     Objectives:  Vitals: /86 (BP Location: Right arm, Patient Position:  Sitting, Cuff Size: Adult Large)   Pulse 80   Temp (!) 96.4  F (35.8  C) (Tympanic)   Wt 91.4 kg (201 lb 9.6 oz)   SpO2 98%   BMI 34.60 kg/m     Exam:   Constitutional: Appears well, no distress  HEENT: Pupils equal and round. No scleral icterus or hemorrhage.   Respiratory: no increased work of breathing.   Mus/Skele: no edema of lower extremities  Skin: no petechiae, no ecchymosis on exposed dermis.  P vasc: significant anteromedial varicosities of bilateral lower extremities. Right varicosities extend into distal thigh. Left lateral varicosities with firmness.   Neuro: CN II-XII intact. Normal gait. Aox3            Labs:  CBC RESULTS:   Recent Labs   Lab Test 11/13/23 2010   WBC 10.8   RBC 4.74   HGB 13.6   HCT 42.7   MCV 90   MCH 28.7   MCHC 31.9   RDW 13.0        Last Comprehensive Metabolic Panel:  Sodium   Date Value Ref Range Status   11/13/2023 138 135 - 145 mmol/L Final     Comment:     Reference intervals for this test were updated on 09/26/2023 to more accurately reflect our healthy population. There may be differences in the flagging of prior results with similar values performed with this method. Interpretation of those prior results can be made in the context of the updated reference intervals.      Potassium   Date Value Ref Range Status   11/13/2023 4.1 3.4 - 5.3 mmol/L Final   04/16/2023 4.1 3.5 - 5.0 mmol/L Final     Chloride   Date Value Ref Range Status   11/13/2023 104 98 - 107 mmol/L Final   04/16/2023 110 (H) 98 - 107 mmol/L Final     Carbon Dioxide (CO2)   Date Value Ref Range Status   11/13/2023 23 22 - 29 mmol/L Final   04/16/2023 21 (L) 22 - 31 mmol/L Final     Anion Gap   Date Value Ref Range Status   11/13/2023 11 7 - 15 mmol/L Final   04/16/2023 8 5 - 18 mmol/L Final     Glucose   Date Value Ref Range Status   11/13/2023 105 (H) 70 - 99 mg/dL Final   04/16/2023 131 (H) 70 - 125 mg/dL Final     Urea Nitrogen   Date Value Ref Range Status   11/13/2023 11.6 6.0 - 20.0 mg/dL  Final   04/16/2023 17 8 - 22 mg/dL Final     Creatinine   Date Value Ref Range Status   11/13/2023 0.66 0.51 - 0.95 mg/dL Final     GFR Estimate   Date Value Ref Range Status   11/13/2023 >90 >60 mL/min/1.73m2 Final   12/09/2020 >60 >60 mL/min/1.73m2 Final     Calcium   Date Value Ref Range Status   11/13/2023 10.3 (H) 8.6 - 10.0 mg/dL Final     Liver Function Studies -   Recent Labs   Lab Test 04/16/23  0414   PROTTOTAL 7.3   ALBUMIN 3.7   BILITOTAL 0.2   ALKPHOS 66   AST 16   ALT 18         Imaging:  Recent Results (from the past 744 hour(s))   US Lower Extremity Venous Duplex Left    Narrative    EXAM: US LOWER EXTREMITY VENOUS DUPLEX LEFT  LOCATION: [facility]  DATE: 11/13/2023    INDICATION: left leg swelling x 1 day  COMPARISON: None.  TECHNIQUE: Venous Duplex ultrasound of the left lower extremity with and without compression, augmentation and duplex. Color flow and spectral Doppler with waveform analysis performed.    FINDINGS: Exam includes the common femoral, femoral, popliteal, and contralateral common femoral veins as well as segmentally visualized deep calf veins and greater saphenous vein.     LEFT: No deep vein thrombosis. Superficial venous thrombosis of the lesser saphenous vein in the distal calf. No popliteal cyst.      Impression    IMPRESSION:  1.  No deep venous thrombosis in the left lower extremity.    2.  Superficial venous thrombosis in the distal left calf.   CT Chest Pulmonary Embolism w Contrast    Narrative    EXAM: CT CHEST PULMONARY EMBOLISM W CONTRAST  LOCATION: [facility]  DATE: 11/13/2023    INDICATION: left thoracic back pain  COMPARISON: None.  TECHNIQUE: CT chest pulmonary angiogram during arterial phase injection of IV contrast. Multiplanar reformats and MIP reconstructions were performed. Dose reduction techniques were used.   CONTRAST: 100ml Isovue 370    FINDINGS:  ANGIOGRAM CHEST: No pulmonary embolism. Pulmonary arteries  normal in caliber. Thoracic aorta normal in caliber. No aortic dissection.    HEART: Cardiac chambers within normal limits. No pericardial effusion. No coronary artery calcification.    MEDIASTINUM: No adenopathy or mass.    LUNGS AND PLEURA: No pulmonary mass, consolidation, or suspicious pulmonary nodule. No pleural effusion or pneumothorax.    LIMITED UPPER ABDOMEN: Negative.    MUSCULOSKELETAL: Negative.      Impression    IMPRESSION:  1.  No evidence for pulmonary embolism or other abnormality in the chest.         Narrative & Impression   EXAM: ULTRASOUND VENOUS LEFT LOWER EXTREMITY WITH DOPPLER  LOCATION: [facility]  DATE/TIME: 04/16/2023, 5:29 AM CDT     INDICATION: Left leg swelling and tenderness. Elevated d-dimer.  COMPARISON: None.     TECHNIQUE: Venous Duplex ultrasound of the left lower extremity with and without compression, augmentation and duplex. Color flow and spectral Doppler with waveform analysis performed.     FINDINGS: Exam includes the common femoral, femoral, popliteal veins as well as segmentally visualized deep calf veins and greater saphenous vein.     LEFT: Occlusive thrombus is present within the lesser saphenous vein from the level of the ankle to the popliteal fossa. Normal compressibility of the left common femoral, femoral, popliteal, posterior tibial, peroneal and great saphenous veins.   Unremarkable Doppler waveform evaluation of the common femoral, femoral and popliteal veins.                                                                      IMPRESSION:   1.  Occlusive thrombus is present throughout the left lesser saphenous vein from the level of the ankle to the popliteal fossa.  2.  No evidence of thrombus in the deep veins of the left lower extremity.        EXAM: US VENOUS LOWER EXTREMITY LEFT  LOCATION: [facility]  DATE: 11/29/2023    INDICATION: Acute Superficial Venous Thrombosis Of Left Lower Extremity, pain  COMPARISON:  None.  TECHNIQUE: Venous Duplex ultrasound of the left lower extremity with and without compression, augmentation and duplex. Color flow and spectral Doppler with waveform analysis performed.    FINDINGS: Exam includes the common femoral, femoral, popliteal, and contralateral common femoral veins as well as segmentally visualized deep calf veins and greater saphenous vein.    LEFT: No deep vein thrombosis. No superficial thrombophlebitis. No popliteal cyst.

## 2023-12-05 ENCOUNTER — OFFICE VISIT (OUTPATIENT)
Dept: HEMATOLOGY | Facility: CLINIC | Age: 43
End: 2023-12-05
Attending: PHYSICIAN ASSISTANT
Payer: COMMERCIAL

## 2023-12-05 VITALS
OXYGEN SATURATION: 98 % | WEIGHT: 201.6 LBS | HEART RATE: 80 BPM | TEMPERATURE: 96.4 F | SYSTOLIC BLOOD PRESSURE: 127 MMHG | DIASTOLIC BLOOD PRESSURE: 86 MMHG | BODY MASS INDEX: 34.6 KG/M2

## 2023-12-05 DIAGNOSIS — I80.02 THROMBOPHLEBITIS OF SUPERFICIAL VEINS OF LEFT LOWER EXTREMITY: Primary | ICD-10-CM

## 2023-12-05 DIAGNOSIS — I83.813 VARICOSE VEINS OF BOTH LOWER EXTREMITIES WITH PAIN: ICD-10-CM

## 2023-12-05 DIAGNOSIS — Z79.01 ANTICOAGULATED: ICD-10-CM

## 2023-12-05 PROCEDURE — 99205 OFFICE O/P NEW HI 60 MIN: CPT | Performed by: PHYSICIAN ASSISTANT

## 2023-12-05 PROCEDURE — 99213 OFFICE O/P EST LOW 20 MIN: CPT | Performed by: PHYSICIAN ASSISTANT

## 2023-12-05 RX ORDER — SPIRONOLACTONE 50 MG/1
TABLET, FILM COATED ORAL
COMMUNITY
Start: 2023-11-29

## 2023-12-05 RX ORDER — FERROUS SULFATE 325(65) MG
325 TABLET, DELAYED RELEASE (ENTERIC COATED) ORAL
COMMUNITY
Start: 2023-04-27

## 2023-12-05 NOTE — PATIENT INSTRUCTIONS
[facility]  [facility]  [facility], [place] [address]  Main: [phone], Fax: [phone]    Anjali,  It was a pleasure seeing you today.  Thank you for allowing us to be involved in your care.  Please let us know if there is anything else we can do for you, so that we can be sure you are leaving completely satisfied with your care experience.    Wear compression stockings daily., OK to take off at night. At least knee high - 20-30mmHg grade of compression.     I will refer you to vascular clinic for assessment of varicose veins and possible intervention. We will order a venous competency ultrasound prior to that appointment.     Please stay on your blood thinner:  Take xarelto 20mg once daily with food for the next 7 days and then stop. Please call us with any bleeding issues that you may have.    Prior to travel, take a dose of Xarelto ~ 2 hours prior to your flight.       Patient Education & Resources:  For additional information, please see the following web links:  www.stoptheclot.org, www.clotconnect.org.    Call the [facility]  at [phone].     -If surgeries or procedures are planned (for holding instructions).     -If off anticoagulation, please call during high risk times (long-distance travel, broken bones or trauma, immobilization, surgery, pregnancy, or taking estrogen).     -Any new symptoms of DVT (deep vein thrombosis) or PE (pulmonary embolism)    -pain     -swelling     -redness    -warmth    -shortness of breath    -chest pain    -coughing up blood    Return to clinic as needed.     Your nurse clinician is Abi Floyd, MSN, RN, [phone].   If they are unavailable and you have immediate concerns, please call [phone] and ask for a nurse.         Priscila Pearl, MPH, PA-C  [facility]

## 2023-12-12 ENCOUNTER — ANCILLARY PROCEDURE (OUTPATIENT)
Dept: VASCULAR ULTRASOUND | Facility: CLINIC | Age: 43
End: 2023-12-12
Attending: PHYSICIAN ASSISTANT
Payer: COMMERCIAL

## 2023-12-12 DIAGNOSIS — I80.02 THROMBOPHLEBITIS OF SUPERFICIAL VEINS OF LEFT LOWER EXTREMITY: ICD-10-CM

## 2023-12-12 PROCEDURE — 93970 EXTREMITY STUDY: CPT

## 2024-01-09 ENCOUNTER — OFFICE VISIT (OUTPATIENT)
Dept: VASCULAR SURGERY | Facility: CLINIC | Age: 44
End: 2024-01-09
Attending: PHYSICIAN ASSISTANT
Payer: COMMERCIAL

## 2024-01-09 VITALS
RESPIRATION RATE: 20 BRPM | BODY MASS INDEX: 35.68 KG/M2 | OXYGEN SATURATION: 100 % | HEIGHT: 64 IN | DIASTOLIC BLOOD PRESSURE: 86 MMHG | WEIGHT: 209 LBS | HEART RATE: 65 BPM | TEMPERATURE: 97.7 F | SYSTOLIC BLOOD PRESSURE: 112 MMHG

## 2024-01-09 DIAGNOSIS — I80.02 THROMBOPHLEBITIS OF SUPERFICIAL VEINS OF LEFT LOWER EXTREMITY: ICD-10-CM

## 2024-01-09 DIAGNOSIS — I83.893 SYMPTOMATIC VARICOSE VEINS OF BOTH LOWER EXTREMITIES: Primary | ICD-10-CM

## 2024-01-09 PROCEDURE — 99243 OFF/OP CNSLTJ NEW/EST LOW 30: CPT | Performed by: SPECIALIST

## 2024-01-09 PROCEDURE — 99214 OFFICE O/P EST MOD 30 MIN: CPT | Performed by: SPECIALIST

## 2024-01-09 ASSESSMENT — PAIN SCALES - GENERAL: PAINLEVEL: NO PAIN (0)

## 2024-01-09 NOTE — PATIENT INSTRUCTIONS
Pre-Procedure Instructions for Varicose Vein VenaSeal  We look forward to scheduling a varicose vein procedure for you. First, we will submit a prior authorization to your insurance company if required. This typically takes 10-14 days. We will contact you once we have gotten the approval to schedule the procedure.  The following is helpful information for you regarding your treatment:  It is ok to drive home after the procedure if you wish.   Please allow 1-2 hours for your vein procedure appointment.  Take your routine medications as you normally would except for blood thinners. Aspirin is ok to continue.  If you take Warfarin, Xarelto, or Eliquis this will need to be HELD prior to procedure according to primary care provider or cardiology who prescribes this medication. Please notify us if you take this medication.  We have thigh high compression stocking sizes medium to X-large that will be applied immediately after the procedure. You will need to provide your own if one of these sizes will not fit. Another option is to bring in knee high compression and biker compression shorts.   Please wear comfortable clothing.  We recommend that you bring a change of undergarment in case it gets stained by the cleansing solution.  Feel free to bring a personal music player or a CD to listen to during your procedure.  It is advised not to fly within 3 weeks after the procedure.  You do not have to fast prior to this procedure.  For any questions regarding your procedure please call [phone] to speak with the nurse.  If you would like a Good Mayra Estimate for your upcoming procedure contact Cost of Care Estimates at [phone], advocates are available Monday through Friday 8am - 4:30pm.    VenaSeal Ablation Codes:  - 68380 for first vein in either leg and 79795 for the second vein in the same leg     Varicose veins may be a sign of something more severe - venous reflux disease.  Healthy leg veins have valves that keep  blood flowing to the heart. Venous reflux disease develops when the valves stop working properly and allow blood to flow backward (i.e., reflux) and pool in the lower leg veins.   If venous reflux disease is left untreated, symptoms can worsen over time. Your doctor can help you understand if you have this condition.   Superficial venous reflux disease may cause the following signs and symptoms in your legs:  Varicose veins  Aching  Swelling  Cramping  Heaviness or tiredness  Itching  Restlessness  Open skin sores  Superficial venous reflux disease treatment aims to reduce or stop the backward flow of blood. The following may be prescribed to treat your superficial venous reflux disease. Your doctor can help you decide which treatment is best for you:   Compression stockings   Removing diseased vein   Closing diseased vein (through thermal or non-thermal treatment)         VenaSeal  Closure System  One non-thermal treatment option is the VenaSeal  Closure System, which improves blood flow and relieves symptoms by sealing-or closing-the diseased vein. The system delivers a small amount of a specially formulated medical adhesive to the diseased vein. The adhesive permanently seals the vein and blood is rerouted through nearby healthy veins.    The VenaSeal  closure system is a safe and effective treatment, providing significant improvements in quality of life.  The most common side effect was phlebitis (i.e., inflammation of a vein), and it typically occurred within the first 30 days after the procedure. Phlebitis is a commonly reported side effect in all vein treatments. Phlebitis occurred more frequently in VenaSeal  system-treated subjects than in RFA-treated subjects, though the difference was not statistically significant.           What can I expect of the VenaSeal  procedure?  Before the Procedure:  You will have an ultrasound imaging exam of the leg that is to be treated. This exam is important for assessing  the diseased superficial vein and planning the procedure.    During the Procedure:  Your doctor can discuss the procedure with you. A brief summary of what to expect is below:  You may feel some minor pain or stinging with a needle stick to numb the site where the doctor will access your vein.  Once the area is numb, your doctor will insert the catheter (i.e., a small hollow tube) into your leg. You may feel some pressure from the placement of the catheter.  The catheter will be placed in specific areas along the diseased vein to deliver small amounts of the medical adhesive. You may feel some mild sensation of pulling. Ultrasound will be used during the procedure to guide and position the catheter.    After treatment, the catheter is removed and a small adhesive bandage is placed over the puncture site.  Commonly Asked Questions  When will my symptoms improve?  Symptoms are caused by the diseased superficial vein. Thus, symptoms may improve as soon as the diseased vein is closed.    When can I return to normal activity?  The VenaSeal procedure is designed to reduce recovery time. Many patients return to normal activity immediately after the procedure. Your doctor can help you determine when you can return to normal activity.    Is the VenaSeal procedure painful?  Most patients feel little, if any, pain during the outpatient procedure..     Is there bruising after the VenaSeal  procedure?  Most patients report [id number] bruising after the VenaSeal procedure.    What happens to the VenaSeal  adhesive?  Only a very small amount of VenaSeal  adhesive is used to close the vein. Your body will naturally create scar tissue around the adhesive over time to keep the vessel permanently closed.    How does the VenaSeal  procedure differ from thermal energy procedures?  The VenaSeal  procedure uses an adhesive to close the superficial vein. Thermal energy procedures use heat to close the vein. The intense heat requires a  large volume of numbing medicine, which is injected through many needle sticks. The injections may cause pain and bruising after the procedure.    The VenaSeal  procedure may not be right for everyone. Your doctor can help you decide if the VenaSeal  procedure is right for you. The VenaSeal  procedure is contraindicated for individuals with any of the following conditions:  -Thrombophlebitis migraines (i.e., inflammation of a vein caused by a slow-moving blood clot)  -Acute superficial thrombophlebitis (i.e., inflammation of a vein caused by a blood clot)  -Previous hypersensitivity reactions to the VenaSeal  adhesive or cyanoacrylates  -Acute sepsis (i.e., whole-body inflammation caused by an immune response to an infection)      Recovering at home  Once at home, follow all the instructions you've been given. Be sure to:  Check for signs of infection at the catheter insertion sites (see below)  Wear thigh high compressions as directed  Keep your legs raised (elevated) as directed  Walk a few times a day  Avoid heavy exercise, lifting, and standing for long periods as advised. No lifting >20lbs for 2 weeks.   Avoid air travel, hot baths, saunas, or whirlpools as advised   Call your healthcare provider if you have any of the following:  Fever of 100.4 F (38 C) or higher, or as directed by your provider  Chest pain or trouble breathing  Signs of infection at the catheter insertion site. These include increased redness or swelling (inflammation), warmth, increasing pain, bleeding, or bad-smelling discharge.  Severe numbness or tingling in the treated leg  Severe pain or swelling in the treated leg     Follow-up  You'll have an ultrasound within the same week as the procedure to check for problems, such as blood clots. You will follow up with the provider after 6 weeks.   Risks and possible complications   These include the following:  Bleeding, Infection, Blood clots, Damage to the nerves in the treated area,  Irritation or burning of the skin over the treated vein. Treatment doesn't improve the look or the symptoms of the problem veins  Risks of any medicines used during the treatment

## 2024-01-09 NOTE — PROGRESS NOTES
"[facility]        Patient is here for a consult to discuss Varicose vein(s).        Pt is currently taking no meds that would impact our treatment plan.    /86 (BP Location: Left arm, Patient Position: Sitting, Cuff Size: Adult Regular)   Pulse 65   Temp 97.7  F (36.5  C) (Oral)   Resp 20   Ht 5' 4\" (1.626 m)   Wt 209 lb (94.8 kg)   SpO2 100%   BMI 35.87 kg/m      The provider has been notified that the patient has concerns of bilateral leg throbbing .     Questions patient would like addressed today are: biteral leg throbbing.    Refills are needed: No    Has homecare services and agency name:  No          "

## 2024-01-10 NOTE — PROGRESS NOTES
[facility] Consult      Assessment:     1. varicose veins, bilateral   2. spider veins, bilateral   3. Insuffiencey of bilateral acessory saphenous vein,  left greater saphenous vein and short saphenous vein    Plan:     1. Treatment options of conservative therapy of stockings use, exercise, weight loss, elevating legs when possible.    2. Script for compression stockings 20-30 mm hg  3. Ultrasound to evaluate legs for incompetency of both deep and superficial system .   4. Surgical treatment   Endovenous closure ofbilateral, acessory saphenous vein and left greater and short saphenous vein      Risks and benefits of surgical intervention including infection, burns, dvt, thrombophlebitis, not closing, recurrence, numbness and nerve injury and need for further intervention were all discused    5. Follow up:  for procedure if approved.  .   6. Call for any questions concerns or issues    Subjective:      Anjali Pascal is a 43 year old female  who was referred by Kailey Brynat  for evaluation of varicose veins. Symptoms include pain, aching, fatigue, burning, edema, dermatitis, and episodes of superficial thrombophlebitis. Patient has history of leg selling, pain and vein issues that have progressed. Pain and symptoms have affected daily living and work activities needing medications. Here for evaluation today. Stocking use with compression stockings of 20-30 mm hg or greater for greater then 3 months    Allergies:Patient has no known allergies.    Past Medical History:   Diagnosis Date    Benign essential hypertension     Depressive disorder 01/01/2005    Gestational thrombocytopenia (H24)     Superficial vein thrombosis     Varicosities of leg        History reviewed. No pertinent surgical history.      Current Outpatient Medications:     albuterol (PROVENTIL HFA;VENTOLIN HFA) 90 mcg/actuation inhaler, [ALBUTEROL (PROVENTIL HFA;VENTOLIN HFA) 90 MCG/ACTUATION INHALER] Inhale 2 puffs every 4  "(four) hours as needed for wheezing., Disp: 1 Inhaler, Rfl: 0    ferrous sulfate (FE TABS) 325 (65 Fe) MG EC tablet, Take 325 mg by mouth, Disp: , Rfl:     hydrOXYzine (ATARAX) 25 MG tablet, Take 1 tablet (25 mg) by mouth 3 times daily as needed for itching, Disp: 30 tablet, Rfl: 0    spironolactone (ALDACTONE) 50 MG tablet, Take 1/2 tablet (25mg) by mouth daily for 2 weeks then increase to 1 tablet (50mg) for hirsutism ., Disp: , Rfl:     sucralfate (CARAFATE) 1 gram tablet, [SUCRALFATE (CARAFATE) 1 GRAM TABLET] Take 1 tablet (1 g total) by mouth 4 (four) times a day., Disp: 40 tablet, Rfl: 0    Rivaroxaban ANTICOAGULANT 15 & 20 MG TBPK Starter Therapy Pack, Take 15 mg by mouth 2 times daily (with meals) for 21 days, THEN 20 mg daily with food for 9 days., Disp: 51 each, Rfl: 0     Family History   Problem Relation Age of Onset    Deep Vein Thrombosis (DVT) Maternal Grandmother         reports that she has never smoked. She does not have any smokeless tobacco history on file.      Review of Systems:    Pertinent items are noted in HPI.  Patient has symptomatic veins and changes of bilateral legs. These have progressed to the point of causing symptoms on a daily basis. This causes issues with daily activities and chores such as washing dishes, vacuuming, mowing lawn, outdoor upkeep, and standing for long lengths of time       Objective:     Vitals:    01/09/24 1250   BP: 112/86   BP Location: Left arm   Patient Position: Sitting   Cuff Size: Adult Regular   Pulse: 65   Resp: 20   Temp: 97.7  F (36.5  C)   TempSrc: Oral   SpO2: 100%   Weight: 94.8 kg (209 lb)   Height: 1.626 m (5' 4\")     Body mass index is 35.87 kg/m .    EXAM:  GENERAL: This is a well-developed 43 year old female who appears her stated age  HEAD: normocephalic  HEENT: Pupils equal and reactive bilaterally  MOUTH: mucus membranes intact. Normal dentation  CARDIAC: RRR without murmur  CHEST/LUNG:  Clear to auscultation bilaterally  ABDOMEN: Soft, " nontender, nondistended, no masses noted   NEUROLOGIC: Focally intact, nonfocal, alert and oriented x 3  INTEGUMENT: No open lesions or ulcers  VASCULAR: Pulses intact, symmetrical upper and lower extremities. There areskin changes consistent with chronic venous insufficiency. Varicose veins present in bilateral greater saphenous distribution. Spider veins present bilateral.                      Side:: Bilateral  VCSS  PAIN:: Mild: Occasional, not restricting activity of requiring pain medication  Varicose Veins:: Moderate: Multiple: great saphenous confined to calf and thigh  Venous Edema:: Absent: None  Skin Pigmentation:: Absent: None  Inflamation:: Absent: None  Induration:: Absent: None  Number of active ulcers:: 0  Active ulcer duration:: None  Active ulcer diameter:: None  Compression Therapy:: Wears elastric stockings most days  VCSS Score:: 5  CEAP:: Simple varicose veins only    Imaging:    Reviewed US showing insuffiencey of bilateral anterior accessory saphenous veins , left greater and short saphenous veins.  Candidate for closure on all these vessels    Exam Information    Exam Date Exam Time Accession # Performing Department Results    12/12/23  1:37 PM [id number] [facility]      PACS Images     Show images for US Venous Competency Bilateral     Study Result    Narrative & Impression   BILATERAL Venous Insufficiency Ultrasound (Date: 12/12/23)    BILATERAL Lower Extremity          Indication: Symptomatic varicose veins/leg pain/swelling     Previous: 11/13/23; 11/29/23     Patient History: Swelling, Stasis, and Obesity     Presenting Symptoms:  Swelling, Varicose Veins, Pain, and Stasis     Technique:   Supine and Upright Ultrasound of the Deep and Superficial Veins with Valsalva and Compression Augmentation Maneuvers. Duplex Imaging is performed utilizing gray-scale, Two-dimensional images, color-flow imaging, Doppler waveform analysis, and  Spectral doppler imaging done with provacative maneuvers.      Incompetency Criteria:  Deep vein reflux reported when greater than 1000 msec flow reversal. Superficial vein reflux reported when equal to or greater than 600 msec flow reversal.  vein reflux reported as greater than 350 msec flow reversal.      Right  Leg Deep Veins    CFV SFJ PFV PROX FV   PROX FV MID FV DIST POP V. PERON.   V. PTV'S   Compressibility  (FC,PC,NC) FC FC FC FC FC FC FC FC FC   Reflux -   - - - - -   -         Right Leg Saphenous Veins  Location SFJ PROX THIGH KNEE MID CALF SPJ PROX CALF MID CALF   RT GSV (mm) 6 3 3 3         Reflux - - - -         RT SSV (mm)         7 4 4   Reflux         - - -      Location SFJ PROX THIGH MID THIGH   RT AASV (mm) 9 9 6   Reflux + + +      Left Leg Deep Veins    CFV SFJ PFV PROX SFV PROX SFV MID SFV DIST POP V. PERON. V. PTV'S   Compressibility  (FC,PC,NC) FC FC FC FC FC FC FC FC FC   Reflux -   - - - - -   -         Lt Leg Saphenous Veins   Location SFJ PROX THIGH KNEE MID CALF SPJ PROX CALF MID CALF   LT GSV (mm) 7 6 4 3         Reflux + - + -         LT SSV (mm)         8 7 6   Reflux         + + +      Location SFJ PROX THIGH MID THIGH   LT AASV (mm) 8 7 4   Reflux + + +      Comments: No incompetent  veins visualized. Right AASV is straight enough to ablate. Left GSV, SSV, and AASV are straight enough to ablate.      Impression:       Right Deep Vein Findings: Patent deep venous system with no evidence of DVT and no reflux     Left Deep Vein Findings: Patent deep venous system with no evidence of DVT and no reflux     Superficial Vein Findings:      Right Greater Saphenous vein: Patent Greater Saphenous Vein without evidence of reflux.     Right Small Saphenous Vein: Patent Small Saphenous Vein without evidence of reflux.     Left Greater Saphenous Vein: Patent Greater Saphenous vein with Reflux noted at the saphenofemoral junction and knee with a Maximum diameter of 7 mm .      Left Small Saphenous Vein: Patent Small Saphenous vein with Reflux noted throughout with a Maximum diameter of 8 mm .     Perforating and Accessory Veins: Incompetent right anterior accessory saphenous vein from the saphenofemoral junction to the mid thigh with maximal diameter of 9 mm.  Incompetent left anterior accessory saphenous vein from the saphenofemoral junction to the mid thigh with maximal diameter of 8 mm.     Reference:     Compressibility: FC= Fully compressible, PC= Partially compressible, NC= Non-compressible, NV= Not Visualized     Reflux: (+) Incompetent  (-) Competent, (NV) = Not Visualized     Interpretation criteria:          Duration of Retrograde flow (milliseconds)  Category Deep Veins Superficial Veins  veins   Competent < 1000ms < 500ms < 350ms   Incompetent > 1000ms > 500ms > 350ms           Jay Paris MD  General Surgery [phone]  Vascular Surgery [phone]

## 2024-02-28 ENCOUNTER — OFFICE VISIT (OUTPATIENT)
Dept: VASCULAR ULTRASOUND | Facility: CLINIC | Age: 44
End: 2024-02-28
Attending: SPECIALIST
Payer: COMMERCIAL

## 2024-02-28 VITALS
HEART RATE: 88 BPM | RESPIRATION RATE: 16 BRPM | DIASTOLIC BLOOD PRESSURE: 61 MMHG | SYSTOLIC BLOOD PRESSURE: 108 MMHG | TEMPERATURE: 97.9 F | OXYGEN SATURATION: 99 %

## 2024-02-28 DIAGNOSIS — I83.893 SYMPTOMATIC VARICOSE VEINS OF BOTH LOWER EXTREMITIES: ICD-10-CM

## 2024-02-28 DIAGNOSIS — I80.02 THROMBOPHLEBITIS OF SUPERFICIAL VEINS OF LEFT LOWER EXTREMITY: Primary | ICD-10-CM

## 2024-02-28 PROCEDURE — 36482 ENDOVEN THER CHEM ADHES 1ST: CPT | Mod: 50 | Performed by: SPECIALIST

## 2024-02-28 PROCEDURE — 36483 ENDOVEN THER CHEM ADHES SBSQ: CPT | Mod: LT | Performed by: SPECIALIST

## 2024-02-28 PROCEDURE — 36482 ENDOVEN THER CHEM ADHES 1ST: CPT

## 2024-02-28 RX ORDER — LIDOCAINE HYDROCHLORIDE 20 MG/ML
5 INJECTION, SOLUTION INFILTRATION; PERINEURAL ONCE
Status: SHIPPED | OUTPATIENT
Start: 2024-02-28

## 2024-02-28 ASSESSMENT — PAIN SCALES - GENERAL: PAINLEVEL: MODERATE PAIN (4)

## 2024-02-28 NOTE — PATIENT INSTRUCTIONS
Discharge Instructions Following Venous Closure  Today, you had this procedure done:   - Vein closure via endoseal (VenaSeal)    Dressing  Leakage from the numbing medications the first few hours is normal if you had an RFA.   Wear your thigh high compression for 48 hours continuously until your ultrasound after the procedure.  It is ok to remove your stocking to shower in 24 hours but then reapply after.  Wear the thigh high stocking for two weeks after the procedure while you are up. It is ok to take off when you sleep.   After two weeks, you can transition into compression stockings of your choice.     Activity  On the day of the procedure, make sure to walk around the house for a few minutes each hour until bedtime.  The day after the procedure you should advance activity as tolerated.  NO strenuous activities though for 2 weeks.  In general, avoid prolonged standing in place and sitting with your legs down.  Both activities will cause blood to pool in your legs resulting in more swelling and discomfort.  Do not drive or operate heavy machinery for 24 hours after the procedure (excludes if you had a VenaSeal).   Do not take baths or use hot tubs or swimming pools until your incision site is healed.  Do not fly for the next 3 weeks.  Do not lift > 20 lbs. for 2 weeks.     Post Procedure Ultrasound & Follow-up  You will need an ultrasound within 2-3 days following the closure procedure.  Then plan to see your surgeon in the office six weeks after your procedure. Call us to schedule this appointment if one has not already been made.  Post Procedure Signs and Symptoms  There can be a mild amount of bruising and numbness after this procedure.  This will typically take a few weeks to fade.  You may feel pain or tenderness in your inner thigh.  Mild pain medication such as Tylenol or Ibuprofen maybe helpful.  It is normal to have fluid leaking from the injection areas the day of the procedure and it may be blood  tinged.      Call your healthcare provider if you have any of the following:  Fever of 100.4 F (38 C) or higher, or as directed by your provider  Chest pain or trouble breathing  Signs of infection at the catheter insertion site. These include increased redness or swelling (inflammation), warmth, increasing pain, bleeding, or bad-smelling discharge.  Severe numbness or tingling in the treated leg  Severe pain or swelling in the treated leg  For emergencies after 4:30pm Monday - Friday, holidays and weekends:  Dr. Jay Paris, [facility] Surgery at [phone]  Dr. Alessio Das, [facility] at [phone]  Thank you for allowing us to be part of your care

## 2024-03-01 ENCOUNTER — ANCILLARY PROCEDURE (OUTPATIENT)
Dept: VASCULAR ULTRASOUND | Facility: CLINIC | Age: 44
End: 2024-03-01
Attending: SPECIALIST
Payer: COMMERCIAL

## 2024-03-01 DIAGNOSIS — I80.02 THROMBOPHLEBITIS OF SUPERFICIAL VEINS OF LEFT LOWER EXTREMITY: ICD-10-CM

## 2024-03-01 DIAGNOSIS — I83.893 SYMPTOMATIC VARICOSE VEINS OF BOTH LOWER EXTREMITIES: ICD-10-CM

## 2024-03-01 PROCEDURE — 93971 EXTREMITY STUDY: CPT | Mod: 26 | Performed by: SURGERY

## 2024-03-01 PROCEDURE — 93971 EXTREMITY STUDY: CPT | Mod: LT

## 2024-03-26 NOTE — PATIENT INSTRUCTIONS
"We would like you to follow up in about 4 months. You can resume your normal activities. It is important to remember that venous reflux disease is a life-long disease, and you should wear compression stockings as much as you can. They do not need to be worn at night while in bed. It is especially important to wear compression with long periods of sitting, standing, long car rides or if you will be flying. Compression socks should get refilled every 4-6 months. If you do a lot of standing it is good to do calf raises to help keep the blood pumping. If you sit a lot at work, it is good to get up periodically to walk around. Elevation of the foot of your bed 4-6\" helps the blood return back to where it is needed.      "

## 2024-04-10 ENCOUNTER — OFFICE VISIT (OUTPATIENT)
Dept: VASCULAR SURGERY | Facility: CLINIC | Age: 44
End: 2024-04-10
Attending: SPECIALIST
Payer: COMMERCIAL

## 2024-04-10 VITALS
OXYGEN SATURATION: 98 % | HEART RATE: 78 BPM | DIASTOLIC BLOOD PRESSURE: 81 MMHG | SYSTOLIC BLOOD PRESSURE: 117 MMHG | RESPIRATION RATE: 12 BRPM

## 2024-04-10 DIAGNOSIS — I83.893 SYMPTOMATIC VARICOSE VEINS OF BOTH LOWER EXTREMITIES: Primary | ICD-10-CM

## 2024-04-10 PROCEDURE — 99214 OFFICE O/P EST MOD 30 MIN: CPT | Performed by: SPECIALIST

## 2024-04-10 PROCEDURE — 99212 OFFICE O/P EST SF 10 MIN: CPT | Performed by: SPECIALIST

## 2024-04-10 ASSESSMENT — PAIN SCALES - GENERAL: PAINLEVEL: NO PAIN (1)

## 2024-04-10 NOTE — PROGRESS NOTES
[facility]        Patient is here for a 6 week follow up to discuss 2/28/24 bilateral AASV and left SSV venaseal. Pt reports right medial thigh tenderness but has seen improvement with this. Pt is back in knee-high compression stockings.     Pt is currently taking no meds that would impact our treatment plan.    /81   Pulse 78   Resp 12   SpO2 98%     The provider has been notified that the patient has no concerns.     Questions patient would like addressed today are: N/A.    Refills are needed: N/A    Has homecare services and agency name:  No

## 2024-04-16 NOTE — PROGRESS NOTES
Post Procedure Note Endovenous Closure    S: Anjali Pascal is a 43 year old female S/P Right  acessory saphenous vein leg endovenous closure ofLeft  short saphenous vein and acessory saphenous veinlower ext. 6 weeks out from procedure. Doing well, wore female  thigh high socks. Minimal discomfort. Some superficial phlibitis. Which is resolving.     O:   Vitals:    04/10/24 1044   BP: 117/81   Pulse: 78   Resp: 12   SpO2: 98%       Status: doing well    General: no apparent distress  Legs look good no signs of infection, incisions healing nicely.                      Side:: Bilateral  VCSS  PAIN:: Mild: Occasional, not restricting activity of requiring pain medication  Varicose Veins:: Moderate: Multiple: great saphenous confined to calf and thigh  Venous Edema:: Absent: None  Skin Pigmentation:: Absent: None  Inflamation:: Absent: None  Induration:: Absent: None  Number of active ulcers:: 0  Active ulcer duration:: None  Active ulcer diameter:: None  Compression Therapy:: Full compliance stockings + elevation  VCSS Score:: 6  CEAP:: Simple varicose veins only    Imaging:    Exam Information    Exam Date Exam Time Accession # Performing Department Results    3/1/24 10:41 AM [id number] [facility] [place]      PACS Images     Show images for US Venous Post Ablation Legs Bilateral     Study Result    Narrative & Impression   Bilateral Venous Ultrasound Status Post Chemical Ablation (Date: 03/01/24)        Indication: Follow-up saphenous vein Chemical ablation     Date of Procedure: Right 2/28/2024   Left 2/28/2024      Bilateral CFV/SFJ Compression:  Fully Compressible     Left POP V./SPJ Compression:  Fully Compressible     Reference:   (FC)-Fully Compressible           (PC)-Partially Compressible   (NC) Non-Compressible     Location Right AASV Left AASV   Proximal Thigh NC NC   Mid Thigh NC NC   Distal Thigh FC FC         Location Left SSV   Knee NC   Proximal Calf NC    Mid Calf NC   Distal Calf NC         Comments:      Impression: Non compressible Right and left AASV from proximal thigh to mid thigh consistent with procedural occlusion.  Patent caudal to access. Patent SFJ/CFV confluence without evidence of thrombus extension bilaterally.  Non compressible left SSV from knee to distal calf consistent with procedural occlusion. Patent SPJ/Pop V confluence without evidence of thrombus extension.        Reference:     Compressibility: FC= Fully compressible, PC= Partially compressible, NC= Non-compressible, NV= Not Visualized     Venous Doppler: (+) = Present  (0) = Absent  (-) = Decreased/Unable to Evaluate, (NV) = Not Visualized           A/P: S/p endovenous closure. For insuffiencey of veins     May switch to knee high support socks   Resume all activities   RTC 4 months   Call for any questions or concerns     Jay Paris MD   [facility]

## 2024-07-31 NOTE — PATIENT INSTRUCTIONS
"It is important to remember that venous reflux disease is a life-long disease, and you should wear compression stockings as much as you can. They do not need to be worn at night while in bed. It is especially important to wear compression with long periods of sitting, standing, long car rides or if you will be flying. Compression socks should get refilled every 4-6 months. If you do a lot of standing it is good to do calf raises to help keep the blood pumping. If you sit a lot at work, it is good to get up periodically to walk around. Elevation of the foot of your bed 4-6\" helps the blood return back to where it is needed. We can refill your compression prescription for 1 year otherwise your primary care provider is able to refill them for you. Below is a list of places you may go to get your compression stockings.    Please call us with any issues or questions [phone].    [facility]  [address] [place] [address]  Phone: [phone]  Fax: [phone] [facility]  [address] [place] [address]  Phone: [phone]  Fax: [phone]   [facility]  [address] [place] [address]  Phone: [phone]  Fax: [phone] [facility]  [address]  [address] [place] [address]  Phone: [phone]  Fax: [phone]   [facility]  [address] [place] [address]  Phone: [phone] [facility]   [address] ([facility] [place])  [place] [address]  Phone: [phone]   [facility]  [address]  [address] [place] [address]      Phone: [phone]  Fax: [phone] [address] [place] [address]      Phone: [phone]  Fax: [phone]     [facility]" Supply [email or web address]  [address], [place] [address]  [phone]    [facility]  [email or web address]  [address] [place] [address]  Phone: [phone]     [address] [place] [address]  Phone: [phone] [address], [place] [address]  Phone: [phone] [address], [place] [address]  Phone: [phone]    [facility] [email or web address]  [address], [place] [address]  Phone: [phone]    [address] [place] [place] [address]  Phone: [phone] [address], [place] [address]  Phone: [phone]    Dottie Mackay  [email or web address]  [phone]

## 2024-08-07 ENCOUNTER — OFFICE VISIT (OUTPATIENT)
Dept: VASCULAR SURGERY | Facility: CLINIC | Age: 44
End: 2024-08-07
Attending: SPECIALIST

## 2024-08-07 VITALS
OXYGEN SATURATION: 98 % | TEMPERATURE: 98.2 F | DIASTOLIC BLOOD PRESSURE: 81 MMHG | SYSTOLIC BLOOD PRESSURE: 115 MMHG | HEART RATE: 72 BPM

## 2024-08-07 DIAGNOSIS — I83.893 SYMPTOMATIC VARICOSE VEINS OF BOTH LOWER EXTREMITIES: Primary | ICD-10-CM

## 2024-08-07 PROCEDURE — 99213 OFFICE O/P EST LOW 20 MIN: CPT | Performed by: SPECIALIST

## 2024-08-07 ASSESSMENT — PAIN SCALES - GENERAL: PAINLEVEL: NO PAIN (0)

## 2024-08-07 NOTE — PROGRESS NOTES
[facility]    Patient is here for a 6 months follow up to discuss 2/28/24 bilateral AASV and left SSV venaseal. Pt reports bilateral legs swelling with activities but elevating legs helps with swelling. Pt wears compression stockings daily.        Pt is currently taking no meds that would impact our treatment plan.    /81   Pulse 72   Temp 98.2  F (36.8  C)   SpO2 98%     The provider has been notified that the patient has no concerns.     Questions patient would like addressed today are: N/A.    Refills are needed: No    Has homecare services and agency name:  No

## 2024-08-20 NOTE — PROGRESS NOTES
[facility] Surgery Follow up    HPI:    44 year old year old female who returns for a follow up. She underwent closure of bilateral legs almost 6 months ago. Doing well, wears compression, still some swelling at times. Active.     Allergies:Patient has no known allergies.    Past Medical History:   Diagnosis Date    Benign essential hypertension     Depressive disorder 01/01/2005    Gestational thrombocytopenia (H24)     Superficial vein thrombosis     Varicosities of leg        History reviewed. No pertinent surgical history.    CURRENT MEDS:  Current Outpatient Medications   Medication Sig Dispense Refill    albuterol (PROVENTIL HFA;VENTOLIN HFA) 90 mcg/actuation inhaler [ALBUTEROL (PROVENTIL HFA;VENTOLIN HFA) 90 MCG/ACTUATION INHALER] Inhale 2 puffs every 4 (four) hours as needed for wheezing. (Patient not taking: Reported on 2/28/2024) 1 Inhaler 0    ferrous sulfate (FE TABS) 325 (65 Fe) MG EC tablet Take 325 mg by mouth (Patient not taking: Reported on 2/28/2024)      hydrOXYzine (ATARAX) 25 MG tablet Take 1 tablet (25 mg) by mouth 3 times daily as needed for itching (Patient not taking: Reported on 2/28/2024) 30 tablet 0    Rivaroxaban ANTICOAGULANT 15 & 20 MG TBPK Starter Therapy Pack Take 15 mg by mouth 2 times daily (with meals) for 21 days, THEN 20 mg daily with food for 9 days. 51 each 0    spironolactone (ALDACTONE) 50 MG tablet Take 1/2 tablet (25mg) by mouth daily for 2 weeks then increase to 1 tablet (50mg) for hirsutism . (Patient not taking: Reported on 2/28/2024)      sucralfate (CARAFATE) 1 gram tablet [SUCRALFATE (CARAFATE) 1 GRAM TABLET] Take 1 tablet (1 g total) by mouth 4 (four) times a day. (Patient not taking: Reported on 2/28/2024) 40 tablet 0     Current Facility-Administered Medications   Medication Dose Route Frequency Provider Last Rate Last Admin    lidocaine 2% injection (MDV)  5 mL Other Once Jay Paris MD           Family History   Problem Relation Age of Onset    Deep Vein  Thrombosis (DVT) Maternal Grandmother         reports that she has never smoked. She does not have any smokeless tobacco history on file.    Review of Systems:  Negative except some swelling occasionally.     OBJECTIVE:  Vitals:    08/07/24 0906   BP: 115/81   Pulse: 72   Temp: 98.2  F (36.8  C)   SpO2: 98%     There is no height or weight on file to calculate BMI.    Status: doing well    EXAM:  GENERAL: This is a well-developed 44 year old female who appears her stated age  HEAD: normocephalic  HEENT: Pupils equal and reactive bilaterally  CARDIAC: RRR without murmur  CHEST/LUNG:  Clear to auscultation  ABDOMEN: Soft, nontender, nondistended, no masses    NEUROLOGIC: Focally intact, nonfocal  VASCULAR: Pulses intact, symmetrical upper and lower extremities.              Side:: Bilateral  VCSS  PAIN:: Absent: None  Varicose Veins:: Moderate: Multiple: great saphenous confined to calf and thigh  Venous Edema:: Absent: None  Skin Pigmentation:: Absent: None  Inflamation:: Absent: None  Induration:: Absent: None  Number of active ulcers:: 0  Active ulcer duration:: None  Active ulcer diameter:: None  Compression Therapy:: Full compliance stockings + elevation  VCSS Score:: 5  CEAP:: Simple varicose veins only    LABS:  Lab Results   Component Value Date    WBC 10.8 11/13/2023    HGB 13.6 11/13/2023    HCT 42.7 11/13/2023    MCV 90 11/13/2023     11/13/2023       Lab Results   Component Value Date    ALT 18 04/16/2023    AST 16 04/16/2023    ALKPHOS 66 04/16/2023        Images:    Reviewed post closure us showing closed bilateral AASV and left SSV.       Exam Information    Exam Date Exam Time Accession # Performing Department Results    3/1/24 10:41 AM [id number] [facility] [place]      PACS Images     Show images for US Venous Post Ablation Legs Bilateral     Study Result    Narrative & Impression   Bilateral Venous Ultrasound Status Post Chemical Ablation (Date:  03/01/24)        Indication: Follow-up saphenous vein Chemical ablation     Date of Procedure: Right 2/28/2024   Left 2/28/2024      Bilateral CFV/SFJ Compression:  Fully Compressible     Left POP V./SPJ Compression:  Fully Compressible     Reference:   (FC)-Fully Compressible           (PC)-Partially Compressible   (NC) Non-Compressible     Location Right AASV Left AASV   Proximal Thigh NC NC   Mid Thigh NC NC   Distal Thigh FC FC         Location Left SSV   Knee NC   Proximal Calf NC   Mid Calf NC   Distal Calf NC         Comments:      Impression: Non compressible Right and left AASV from proximal thigh to mid thigh consistent with procedural occlusion.  Patent caudal to access. Patent SFJ/CFV confluence without evidence of thrombus extension bilaterally.  Non compressible left SSV from knee to distal calf consistent with procedural occlusion. Patent SPJ/Pop V confluence without evidence of thrombus extension.        Reference:     Compressibility: FC= Fully compressible, PC= Partially compressible, NC= Non-compressible, NV= Not Visualized     Venous Doppler: (+) = Present  (0) = Absent  (-) = Decreased/Unable to Evaluate, (NV) = Not Visualized           Assessment/Plan:   Symptomatic varicose veins of both lower extremities   Has done well. Discussed sclerotherapy and laser treatment.   At thi time will stick to compression exercise and conservative treatments.       No follow-ups on file.     Jay Paris MD ,MD  [facility] Department of Surgery

## 2024-12-07 ENCOUNTER — HEALTH MAINTENANCE LETTER (OUTPATIENT)
Age: 44
End: 2024-12-07